# Patient Record
Sex: FEMALE | Race: WHITE | NOT HISPANIC OR LATINO | URBAN - METROPOLITAN AREA
[De-identification: names, ages, dates, MRNs, and addresses within clinical notes are randomized per-mention and may not be internally consistent; named-entity substitution may affect disease eponyms.]

---

## 2023-01-30 ENCOUNTER — INPATIENT (INPATIENT)
Facility: HOSPITAL | Age: 63
LOS: 0 days | Discharge: HOME | DRG: 189 | End: 2023-01-31
Attending: STUDENT IN AN ORGANIZED HEALTH CARE EDUCATION/TRAINING PROGRAM | Admitting: STUDENT IN AN ORGANIZED HEALTH CARE EDUCATION/TRAINING PROGRAM
Payer: SELF-PAY

## 2023-01-30 VITALS
HEIGHT: 68 IN | TEMPERATURE: 98 F | OXYGEN SATURATION: 88 % | RESPIRATION RATE: 18 BRPM | WEIGHT: 104.94 LBS | HEART RATE: 100 BPM | DIASTOLIC BLOOD PRESSURE: 98 MMHG | SYSTOLIC BLOOD PRESSURE: 158 MMHG

## 2023-01-30 LAB
ALBUMIN SERPL ELPH-MCNC: 4 G/DL — SIGNIFICANT CHANGE UP (ref 3.4–5)
ALP SERPL-CCNC: 93 U/L — SIGNIFICANT CHANGE UP (ref 40–120)
ALT FLD-CCNC: 46 U/L — HIGH (ref 12–42)
ANION GAP SERPL CALC-SCNC: 9 MMOL/L — SIGNIFICANT CHANGE UP (ref 9–16)
APPEARANCE UR: CLEAR — SIGNIFICANT CHANGE UP
APTT BLD: 34.1 SEC — SIGNIFICANT CHANGE UP (ref 27.5–35.5)
AST SERPL-CCNC: 38 U/L — HIGH (ref 15–37)
BASOPHILS # BLD AUTO: 0.03 K/UL — SIGNIFICANT CHANGE UP (ref 0–0.2)
BASOPHILS NFR BLD AUTO: 0.4 % — SIGNIFICANT CHANGE UP (ref 0–2)
BILIRUB SERPL-MCNC: 0.3 MG/DL — SIGNIFICANT CHANGE UP (ref 0.2–1.2)
BILIRUB UR-MCNC: NEGATIVE — SIGNIFICANT CHANGE UP
BUN SERPL-MCNC: 10 MG/DL — SIGNIFICANT CHANGE UP (ref 7–23)
CALCIUM SERPL-MCNC: 8.9 MG/DL — SIGNIFICANT CHANGE UP (ref 8.5–10.5)
CHLORIDE SERPL-SCNC: 101 MMOL/L — SIGNIFICANT CHANGE UP (ref 96–108)
CO2 SERPL-SCNC: 28 MMOL/L — SIGNIFICANT CHANGE UP (ref 22–31)
COLOR SPEC: YELLOW — SIGNIFICANT CHANGE UP
CREAT SERPL-MCNC: 0.75 MG/DL — SIGNIFICANT CHANGE UP (ref 0.5–1.3)
DIFF PNL FLD: NEGATIVE — SIGNIFICANT CHANGE UP
EGFR: 90 ML/MIN/1.73M2 — SIGNIFICANT CHANGE UP
EOSINOPHIL # BLD AUTO: 0.03 K/UL — SIGNIFICANT CHANGE UP (ref 0–0.5)
EOSINOPHIL NFR BLD AUTO: 0.4 % — SIGNIFICANT CHANGE UP (ref 0–6)
FLUAV H1 2009 PAND RNA SPEC QL NAA+PROBE: SIGNIFICANT CHANGE UP
FLUAV H1 RNA SPEC QL NAA+PROBE: SIGNIFICANT CHANGE UP
FLUAV H3 RNA SPEC QL NAA+PROBE: SIGNIFICANT CHANGE UP
FLUAV SUBTYP SPEC NAA+PROBE: SIGNIFICANT CHANGE UP
FLUBV RNA SPEC QL NAA+PROBE: SIGNIFICANT CHANGE UP
GLUCOSE SERPL-MCNC: 123 MG/DL — HIGH (ref 70–99)
GLUCOSE UR QL: NEGATIVE — SIGNIFICANT CHANGE UP
HCT VFR BLD CALC: 44.6 % — SIGNIFICANT CHANGE UP (ref 34.5–45)
HGB BLD-MCNC: 14.8 G/DL — SIGNIFICANT CHANGE UP (ref 11.5–15.5)
HMPV RNA SPEC QL NAA+PROBE: DETECTED
IMM GRANULOCYTES NFR BLD AUTO: 0.1 % — SIGNIFICANT CHANGE UP (ref 0–0.9)
INR BLD: 0.98 — SIGNIFICANT CHANGE UP (ref 0.88–1.16)
KETONES UR-MCNC: 15 MG/DL
LACTATE SERPL-SCNC: 0.8 MMOL/L — SIGNIFICANT CHANGE UP (ref 0.4–2)
LEUKOCYTE ESTERASE UR-ACNC: NEGATIVE — SIGNIFICANT CHANGE UP
LYMPHOCYTES # BLD AUTO: 0.95 K/UL — LOW (ref 1–3.3)
LYMPHOCYTES # BLD AUTO: 13.2 % — SIGNIFICANT CHANGE UP (ref 13–44)
MCHC RBC-ENTMCNC: 31.9 PG — SIGNIFICANT CHANGE UP (ref 27–34)
MCHC RBC-ENTMCNC: 33.2 GM/DL — SIGNIFICANT CHANGE UP (ref 32–36)
MCV RBC AUTO: 96.1 FL — SIGNIFICANT CHANGE UP (ref 80–100)
MONOCYTES # BLD AUTO: 0.59 K/UL — SIGNIFICANT CHANGE UP (ref 0–0.9)
MONOCYTES NFR BLD AUTO: 8.2 % — SIGNIFICANT CHANGE UP (ref 2–14)
NEUTROPHILS # BLD AUTO: 5.61 K/UL — SIGNIFICANT CHANGE UP (ref 1.8–7.4)
NEUTROPHILS NFR BLD AUTO: 77.7 % — HIGH (ref 43–77)
NITRITE UR-MCNC: NEGATIVE — SIGNIFICANT CHANGE UP
NRBC # BLD: 0 /100 WBCS — SIGNIFICANT CHANGE UP (ref 0–0)
PH UR: 6 — SIGNIFICANT CHANGE UP (ref 5–8)
PLATELET # BLD AUTO: 181 K/UL — SIGNIFICANT CHANGE UP (ref 150–400)
POTASSIUM SERPL-MCNC: 3.9 MMOL/L — SIGNIFICANT CHANGE UP (ref 3.5–5.3)
POTASSIUM SERPL-SCNC: 3.9 MMOL/L — SIGNIFICANT CHANGE UP (ref 3.5–5.3)
PROT SERPL-MCNC: 7.6 G/DL — SIGNIFICANT CHANGE UP (ref 6.4–8.2)
PROT UR-MCNC: NEGATIVE MG/DL — SIGNIFICANT CHANGE UP
PROTHROM AB SERPL-ACNC: 11.5 SEC — SIGNIFICANT CHANGE UP (ref 10.5–13.4)
RAPID RVP RESULT: DETECTED
RBC # BLD: 4.64 M/UL — SIGNIFICANT CHANGE UP (ref 3.8–5.2)
RBC # FLD: 14.3 % — SIGNIFICANT CHANGE UP (ref 10.3–14.5)
SARS-COV-2 RNA SPEC QL NAA+PROBE: SIGNIFICANT CHANGE UP
SODIUM SERPL-SCNC: 138 MMOL/L — SIGNIFICANT CHANGE UP (ref 132–145)
SP GR SPEC: 1.01 — SIGNIFICANT CHANGE UP (ref 1–1.03)
TROPONIN I, HIGH SENSITIVITY RESULT: 4.7 NG/L — SIGNIFICANT CHANGE UP
UROBILINOGEN FLD QL: 0.2 E.U./DL — SIGNIFICANT CHANGE UP
WBC # BLD: 7.22 K/UL — SIGNIFICANT CHANGE UP (ref 3.8–10.5)
WBC # FLD AUTO: 7.22 K/UL — SIGNIFICANT CHANGE UP (ref 3.8–10.5)

## 2023-01-30 PROCEDURE — 99285 EMERGENCY DEPT VISIT HI MDM: CPT

## 2023-01-30 PROCEDURE — 71045 X-RAY EXAM CHEST 1 VIEW: CPT | Mod: 26

## 2023-01-30 PROCEDURE — 99223 1ST HOSP IP/OBS HIGH 75: CPT | Mod: GC

## 2023-01-30 RX ORDER — IPRATROPIUM/ALBUTEROL SULFATE 18-103MCG
3 AEROSOL WITH ADAPTER (GRAM) INHALATION ONCE
Refills: 0 | Status: COMPLETED | OUTPATIENT
Start: 2023-01-30 | End: 2023-01-30

## 2023-01-30 RX ORDER — ALBUTEROL 90 UG/1
2.5 AEROSOL, METERED ORAL ONCE
Refills: 0 | Status: COMPLETED | OUTPATIENT
Start: 2023-01-30 | End: 2023-01-30

## 2023-01-30 RX ORDER — NICOTINE POLACRILEX 2 MG
14 GUM BUCCAL ONCE
Refills: 0 | Status: DISCONTINUED | OUTPATIENT
Start: 2023-01-30 | End: 2023-01-30

## 2023-01-30 RX ORDER — MAGNESIUM SULFATE 500 MG/ML
2 VIAL (ML) INJECTION ONCE
Refills: 0 | Status: COMPLETED | OUTPATIENT
Start: 2023-01-30 | End: 2023-01-30

## 2023-01-30 RX ORDER — SODIUM CHLORIDE 9 MG/ML
1500 INJECTION INTRAMUSCULAR; INTRAVENOUS; SUBCUTANEOUS ONCE
Refills: 0 | Status: COMPLETED | OUTPATIENT
Start: 2023-01-30 | End: 2023-01-30

## 2023-01-30 RX ORDER — NICOTINE POLACRILEX 2 MG
1 GUM BUCCAL ONCE
Refills: 0 | Status: COMPLETED | OUTPATIENT
Start: 2023-01-30 | End: 2023-01-30

## 2023-01-30 RX ADMIN — Medication 3 MILLILITER(S): at 11:17

## 2023-01-30 RX ADMIN — SODIUM CHLORIDE 1500 MILLILITER(S): 9 INJECTION INTRAMUSCULAR; INTRAVENOUS; SUBCUTANEOUS at 14:11

## 2023-01-30 RX ADMIN — Medication 80 MILLIGRAM(S): at 20:16

## 2023-01-30 RX ADMIN — ALBUTEROL 2.5 MILLIGRAM(S): 90 AEROSOL, METERED ORAL at 14:00

## 2023-01-30 RX ADMIN — Medication 1 PATCH: at 15:49

## 2023-01-30 RX ADMIN — ALBUTEROL 2.5 MILLIGRAM(S): 90 AEROSOL, METERED ORAL at 20:16

## 2023-01-30 RX ADMIN — ALBUTEROL 2.5 MILLIGRAM(S): 90 AEROSOL, METERED ORAL at 16:50

## 2023-01-30 RX ADMIN — Medication 3 MILLILITER(S): at 11:23

## 2023-01-30 RX ADMIN — Medication 80 MILLIGRAM(S): at 11:23

## 2023-01-30 RX ADMIN — Medication 150 GRAM(S): at 16:49

## 2023-01-30 RX ADMIN — SODIUM CHLORIDE 1500 MILLILITER(S): 9 INJECTION INTRAMUSCULAR; INTRAVENOUS; SUBCUTANEOUS at 11:23

## 2023-01-30 NOTE — ED ADULT TRIAGE NOTE - CHIEF COMPLAINT QUOTE
Pt walks in c/o subjective fever for 2 days and shortness of breath since 5pm last night. SpO2 88% on RA during triage.

## 2023-01-30 NOTE — ED PROVIDER NOTE - PHYSICAL EXAMINATION
General:  Moderate distress.  Tachypneic, dyspneic   HEENT:  No conjunctival injection, no ocular discharge, MM dry   Chest:  Non-tender, no crepitance  Lungs: Diminished sounds bilaterally with wheezing   Heart:  s1s2 normal, no murmur  Abdomen:  soft, non-tender, non-distended  :  Deferred  Rectal:  Deferred  Extremities: No edema, normal perfusion, no joint swelling or tenderness.  no calf tenderness  Neuro:  Alert and oriented x 3, cn2-12 intact, full strength, sensory grossly intact, normal gait  Psychiatry:  Calm, cooperative, no expression of suicidal or homicidal ideation

## 2023-01-30 NOTE — ED ADULT NURSE REASSESSMENT NOTE - NS ED NURSE REASSESS COMMENT FT1
Bedboard cancelled bed. Charge aware, ANM and providers made aware.
Dr William currently reviewing pt
Received care back from mery oropeza, checked in on patient, still feeling sob and wheezy when removing 02 to go to bathroom, pt independent, able to remove own 02 nasal canulla and place back on when back from en suite bathroom, meds given, pt talking in full sentences at present, gcs 15, awaiting transport , pt aware
pt got up to go to toilet (en suite in room), immediately felt tight in the chest, came back to bed with 02 sats 86% on room air and pt had distinct increase work of breathing, placed back on 5 litres of 02 via nasal prongs, dr fields aware, given albuterol neb, o2 sats currently 98% with neb going, dr fields aware, sitting bolt upright in bed as before
Received Pt from previous RN sitting up on stretcher asleep, breathing with ease on NC and NAD. CCM in place. Pt awaiting bed status.

## 2023-01-30 NOTE — ED PROVIDER NOTE - CARE PLAN
Principal Discharge DX:	Infection due to human metapneumovirus (hMPV)  Secondary Diagnosis:	Acute hypoxemic respiratory failure   1

## 2023-01-30 NOTE — ED PROVIDER NOTE - OBJECTIVE STATEMENT
64 yo F with cough and progressively worsening SOB since yesterday  Patient is a daily smoker 1/2 PPD  No prior dx of COPD/asthma, no history of use of inhalers  No fever, non-productive cough  No chest pain, no leg swelling  No similar prior episodes

## 2023-01-30 NOTE — ED ADULT NURSE NOTE - HISTORY OF COVID-19 VACCINATION
47 y/o female with hx HTN, Psych disorder presents to the ED c/o "I bit into a pill 2 days ago and I broke my front tooth. Can I get it fixed?" no fever/ chills/ difficulty swallowing Yes

## 2023-01-30 NOTE — ED PROVIDER NOTE - CLINICAL SUMMARY MEDICAL DECISION MAKING FREE TEXT BOX
62 yo F who is a smoker with URI symptoms associated with SOB.  Moderate distress with hypoxia.  Differential includes COPD, Viral Illness, pneumonia or a combination of 62 yo F who is a smoker with URI symptoms associated with SOB.  Moderate distress with hypoxia.  Differential includes COPD, Viral Illness, pneumonia or a combination of these issues.  Will need labs, xr, nebs, steroids and will re-evaluate.    Summary of care: Patient improved from time of arrival but remains tachypneic and dyspneic.  Desaturates into the low 80s with minimal exertion off oxygen.  Accordingly, patient informed that she will need admission.  We discussed results and diagnosis as well as care plan. Patient agreed with plan to go to [facility].    Case d/w Dr. Alvarado    The patient was seen immediately upon arrival due to a high probability of imminent or life-threatening deterioration secondary to respiratory distress which required my direct attention, intervention, and personal management at the bedside. I have personally provided critical care time exclusive of time spent on separately billable procedures. Time includes review of laboratory data, radiology results, discussion with consultants, discussion with the patient's family, and monitoring for potential decompensation.  Critical care time: 30 minutes.

## 2023-01-30 NOTE — ED PROVIDER NOTE - NS ED ROS FT
Constitutional:  No fever, no weakness, no dizziness  HEENT:  No change in vision, no discharge, + congestion  Cardiac:  No chest pain, palpitations  Pulm:  +cough +sob  GI:  No abd pain, no vomiting, no diarrhea  MSK:  No joint pain or swelling/no calf pain

## 2023-01-31 ENCOUNTER — TRANSCRIPTION ENCOUNTER (OUTPATIENT)
Age: 63
End: 2023-01-31

## 2023-01-31 VITALS — WEIGHT: 104.94 LBS

## 2023-01-31 DIAGNOSIS — Z29.9 ENCOUNTER FOR PROPHYLACTIC MEASURES, UNSPECIFIED: ICD-10-CM

## 2023-01-31 DIAGNOSIS — J96.01 ACUTE RESPIRATORY FAILURE WITH HYPOXIA: ICD-10-CM

## 2023-01-31 DIAGNOSIS — J44.1 CHRONIC OBSTRUCTIVE PULMONARY DISEASE WITH (ACUTE) EXACERBATION: ICD-10-CM

## 2023-01-31 DIAGNOSIS — R79.89 OTHER SPECIFIED ABNORMAL FINDINGS OF BLOOD CHEMISTRY: ICD-10-CM

## 2023-01-31 DIAGNOSIS — F17.200 NICOTINE DEPENDENCE, UNSPECIFIED, UNCOMPLICATED: ICD-10-CM

## 2023-01-31 DIAGNOSIS — B97.81 HUMAN METAPNEUMOVIRUS AS THE CAUSE OF DISEASES CLASSIFIED ELSEWHERE: ICD-10-CM

## 2023-01-31 PROBLEM — Z00.00 ENCOUNTER FOR PREVENTIVE HEALTH EXAMINATION: Status: ACTIVE | Noted: 2023-01-31

## 2023-01-31 PROBLEM — Z78.9 OTHER SPECIFIED HEALTH STATUS: Chronic | Status: ACTIVE | Noted: 2023-01-30

## 2023-01-31 LAB
A1C WITH ESTIMATED AVERAGE GLUCOSE RESULT: 5.5 % — SIGNIFICANT CHANGE UP (ref 4–5.6)
ANION GAP SERPL CALC-SCNC: 8 MMOL/L — SIGNIFICANT CHANGE UP (ref 5–17)
BASOPHILS # BLD AUTO: 0.01 K/UL — SIGNIFICANT CHANGE UP (ref 0–0.2)
BASOPHILS NFR BLD AUTO: 0.1 % — SIGNIFICANT CHANGE UP (ref 0–2)
BLD GP AB SCN SERPL QL: NEGATIVE — SIGNIFICANT CHANGE UP
BUN SERPL-MCNC: 9 MG/DL — SIGNIFICANT CHANGE UP (ref 7–23)
CALCIUM SERPL-MCNC: 8.9 MG/DL — SIGNIFICANT CHANGE UP (ref 8.4–10.5)
CHLORIDE SERPL-SCNC: 101 MMOL/L — SIGNIFICANT CHANGE UP (ref 96–108)
CO2 SERPL-SCNC: 29 MMOL/L — SIGNIFICANT CHANGE UP (ref 22–31)
CREAT SERPL-MCNC: 0.56 MG/DL — SIGNIFICANT CHANGE UP (ref 0.5–1.3)
EGFR: 103 ML/MIN/1.73M2 — SIGNIFICANT CHANGE UP
EOSINOPHIL # BLD AUTO: 0 K/UL — SIGNIFICANT CHANGE UP (ref 0–0.5)
EOSINOPHIL NFR BLD AUTO: 0 % — SIGNIFICANT CHANGE UP (ref 0–6)
ESTIMATED AVERAGE GLUCOSE: 111 MG/DL — SIGNIFICANT CHANGE UP (ref 68–114)
GLUCOSE BLDC GLUCOMTR-MCNC: 115 MG/DL — HIGH (ref 70–99)
GLUCOSE BLDC GLUCOMTR-MCNC: 172 MG/DL — HIGH (ref 70–99)
GLUCOSE SERPL-MCNC: 127 MG/DL — HIGH (ref 70–99)
HCT VFR BLD CALC: 41.1 % — SIGNIFICANT CHANGE UP (ref 34.5–45)
HCV AB S/CO SERPL IA: 0.05 S/CO — SIGNIFICANT CHANGE UP
HCV AB SERPL-IMP: SIGNIFICANT CHANGE UP
HGB BLD-MCNC: 13.6 G/DL — SIGNIFICANT CHANGE UP (ref 11.5–15.5)
IMM GRANULOCYTES NFR BLD AUTO: 0.5 % — SIGNIFICANT CHANGE UP (ref 0–0.9)
LYMPHOCYTES # BLD AUTO: 0.8 K/UL — LOW (ref 1–3.3)
LYMPHOCYTES # BLD AUTO: 9.3 % — LOW (ref 13–44)
MAGNESIUM SERPL-MCNC: 2.3 MG/DL — SIGNIFICANT CHANGE UP (ref 1.6–2.6)
MCHC RBC-ENTMCNC: 31.9 PG — SIGNIFICANT CHANGE UP (ref 27–34)
MCHC RBC-ENTMCNC: 33.1 GM/DL — SIGNIFICANT CHANGE UP (ref 32–36)
MCV RBC AUTO: 96.3 FL — SIGNIFICANT CHANGE UP (ref 80–100)
MONOCYTES # BLD AUTO: 0.73 K/UL — SIGNIFICANT CHANGE UP (ref 0–0.9)
MONOCYTES NFR BLD AUTO: 8.5 % — SIGNIFICANT CHANGE UP (ref 2–14)
NEUTROPHILS # BLD AUTO: 7.01 K/UL — SIGNIFICANT CHANGE UP (ref 1.8–7.4)
NEUTROPHILS NFR BLD AUTO: 81.6 % — HIGH (ref 43–77)
NRBC # BLD: 0 /100 WBCS — SIGNIFICANT CHANGE UP (ref 0–0)
PHOSPHATE SERPL-MCNC: 3.2 MG/DL — SIGNIFICANT CHANGE UP (ref 2.5–4.5)
PLATELET # BLD AUTO: 179 K/UL — SIGNIFICANT CHANGE UP (ref 150–400)
POTASSIUM SERPL-MCNC: 4.1 MMOL/L — SIGNIFICANT CHANGE UP (ref 3.5–5.3)
POTASSIUM SERPL-SCNC: 4.1 MMOL/L — SIGNIFICANT CHANGE UP (ref 3.5–5.3)
RBC # BLD: 4.27 M/UL — SIGNIFICANT CHANGE UP (ref 3.8–5.2)
RBC # FLD: 14.5 % — SIGNIFICANT CHANGE UP (ref 10.3–14.5)
RH IG SCN BLD-IMP: POSITIVE — SIGNIFICANT CHANGE UP
SODIUM SERPL-SCNC: 138 MMOL/L — SIGNIFICANT CHANGE UP (ref 135–145)
TSH SERPL-MCNC: 0.33 UIU/ML — SIGNIFICANT CHANGE UP (ref 0.27–4.2)
WBC # BLD: 8.59 K/UL — SIGNIFICANT CHANGE UP (ref 3.8–10.5)
WBC # FLD AUTO: 8.59 K/UL — SIGNIFICANT CHANGE UP (ref 3.8–10.5)

## 2023-01-31 PROCEDURE — 99285 EMERGENCY DEPT VISIT HI MDM: CPT | Mod: 25

## 2023-01-31 PROCEDURE — 85730 THROMBOPLASTIN TIME PARTIAL: CPT

## 2023-01-31 PROCEDURE — 86803 HEPATITIS C AB TEST: CPT

## 2023-01-31 PROCEDURE — 83036 HEMOGLOBIN GLYCOSYLATED A1C: CPT

## 2023-01-31 PROCEDURE — 36415 COLL VENOUS BLD VENIPUNCTURE: CPT

## 2023-01-31 PROCEDURE — 86850 RBC ANTIBODY SCREEN: CPT

## 2023-01-31 PROCEDURE — 80048 BASIC METABOLIC PNL TOTAL CA: CPT

## 2023-01-31 PROCEDURE — 84100 ASSAY OF PHOSPHORUS: CPT

## 2023-01-31 PROCEDURE — 85610 PROTHROMBIN TIME: CPT

## 2023-01-31 PROCEDURE — 71045 X-RAY EXAM CHEST 1 VIEW: CPT

## 2023-01-31 PROCEDURE — 82962 GLUCOSE BLOOD TEST: CPT

## 2023-01-31 PROCEDURE — 80053 COMPREHEN METABOLIC PANEL: CPT

## 2023-01-31 PROCEDURE — 83735 ASSAY OF MAGNESIUM: CPT

## 2023-01-31 PROCEDURE — 96374 THER/PROPH/DIAG INJ IV PUSH: CPT

## 2023-01-31 PROCEDURE — 86900 BLOOD TYPING SEROLOGIC ABO: CPT

## 2023-01-31 PROCEDURE — 94640 AIRWAY INHALATION TREATMENT: CPT

## 2023-01-31 PROCEDURE — 99239 HOSP IP/OBS DSCHRG MGMT >30: CPT | Mod: GC

## 2023-01-31 PROCEDURE — 96361 HYDRATE IV INFUSION ADD-ON: CPT

## 2023-01-31 PROCEDURE — 84484 ASSAY OF TROPONIN QUANT: CPT

## 2023-01-31 PROCEDURE — 0225U NFCT DS DNA&RNA 21 SARSCOV2: CPT

## 2023-01-31 PROCEDURE — 85025 COMPLETE CBC W/AUTO DIFF WBC: CPT

## 2023-01-31 PROCEDURE — 84443 ASSAY THYROID STIM HORMONE: CPT

## 2023-01-31 PROCEDURE — 81003 URINALYSIS AUTO W/O SCOPE: CPT

## 2023-01-31 PROCEDURE — 87040 BLOOD CULTURE FOR BACTERIA: CPT

## 2023-01-31 PROCEDURE — 83605 ASSAY OF LACTIC ACID: CPT

## 2023-01-31 PROCEDURE — 86901 BLOOD TYPING SEROLOGIC RH(D): CPT

## 2023-01-31 RX ORDER — AZITHROMYCIN 500 MG/1
500 TABLET, FILM COATED ORAL EVERY 24 HOURS
Refills: 0 | Status: DISCONTINUED | OUTPATIENT
Start: 2023-01-31 | End: 2023-01-31

## 2023-01-31 RX ORDER — PANTOPRAZOLE SODIUM 20 MG/1
40 TABLET, DELAYED RELEASE ORAL
Refills: 0 | Status: DISCONTINUED | OUTPATIENT
Start: 2023-01-31 | End: 2023-01-31

## 2023-01-31 RX ORDER — INSULIN LISPRO 100/ML
VIAL (ML) SUBCUTANEOUS AT BEDTIME
Refills: 0 | Status: DISCONTINUED | OUTPATIENT
Start: 2023-01-31 | End: 2023-01-31

## 2023-01-31 RX ORDER — INFLUENZA VIRUS VACCINE 15; 15; 15; 15 UG/.5ML; UG/.5ML; UG/.5ML; UG/.5ML
0.5 SUSPENSION INTRAMUSCULAR ONCE
Refills: 0 | Status: DISCONTINUED | OUTPATIENT
Start: 2023-01-31 | End: 2023-01-31

## 2023-01-31 RX ORDER — ENOXAPARIN SODIUM 100 MG/ML
30 INJECTION SUBCUTANEOUS EVERY 24 HOURS
Refills: 0 | Status: DISCONTINUED | OUTPATIENT
Start: 2023-01-31 | End: 2023-01-31

## 2023-01-31 RX ORDER — IBUPROFEN 200 MG
400 TABLET ORAL EVERY 6 HOURS
Refills: 0 | Status: DISCONTINUED | OUTPATIENT
Start: 2023-01-31 | End: 2023-01-31

## 2023-01-31 RX ORDER — LANOLIN ALCOHOL/MO/W.PET/CERES
3 CREAM (GRAM) TOPICAL AT BEDTIME
Refills: 0 | Status: DISCONTINUED | OUTPATIENT
Start: 2023-01-31 | End: 2023-01-31

## 2023-01-31 RX ORDER — ALBUTEROL 90 UG/1
1 AEROSOL, METERED ORAL
Qty: 28 | Refills: 0
Start: 2023-01-31 | End: 2023-02-06

## 2023-01-31 RX ORDER — IPRATROPIUM/ALBUTEROL SULFATE 18-103MCG
3 AEROSOL WITH ADAPTER (GRAM) INHALATION EVERY 4 HOURS
Refills: 0 | Status: DISCONTINUED | OUTPATIENT
Start: 2023-01-31 | End: 2023-01-31

## 2023-01-31 RX ORDER — ACETAMINOPHEN 500 MG
650 TABLET ORAL EVERY 6 HOURS
Refills: 0 | Status: DISCONTINUED | OUTPATIENT
Start: 2023-01-31 | End: 2023-01-31

## 2023-01-31 RX ADMIN — Medication 3 MILLILITER(S): at 06:22

## 2023-01-31 RX ADMIN — Medication 40 MILLIGRAM(S): at 06:22

## 2023-01-31 RX ADMIN — Medication 1 PATCH: at 07:00

## 2023-01-31 RX ADMIN — Medication 1 PATCH: at 15:00

## 2023-01-31 RX ADMIN — PANTOPRAZOLE SODIUM 40 MILLIGRAM(S): 20 TABLET, DELAYED RELEASE ORAL at 06:22

## 2023-01-31 RX ADMIN — AZITHROMYCIN 500 MILLIGRAM(S): 500 TABLET, FILM COATED ORAL at 06:23

## 2023-01-31 RX ADMIN — Medication 3 MILLILITER(S): at 02:45

## 2023-01-31 NOTE — H&P ADULT - ASSESSMENT
63F no known PMH, current smoker presents for SOB admitted for acute hypoxic respiratory failure 2/2 human metapneumovirus infection.

## 2023-01-31 NOTE — DIETITIAN INITIAL EVALUATION ADULT - ADD RECOMMEND
1. Continue Regular diet  2. Ensure Plus High Protein, once daily (150kcal, 30gm pro)  3. Monitor diet tolerance  4. Monitor Wt, Skin, Pain, GI   5. Honor food preferences as able   *RD to remain available for additional nutrition interventions as needed

## 2023-01-31 NOTE — H&P ADULT - PROBLEM SELECTOR PLAN 2
- supportive care with antipyretics, antitussives, hydration Likely with underlying obstructive lung disease given hx of smoking but she has not seen doctors in many years and never seen a pulmonologist. Received 160mg solumedrol and duonebs in ED. CXR without infiltrates but with hyperinflated lungs. Noted to have diffuse wheezing by ED provider.  - c/w DuoNebs q4  - prednisone 40mg daily for total 5 day course  - azithromycin 500mg for 3 days  - pulm consult in AM  - needs PCP

## 2023-01-31 NOTE — PROGRESS NOTE ADULT - ASSESSMENT
63 year old F with no significant PMH and extensive current smoking history (1/2ppd for 30 years) admitted for acute hypoxic respiratory failure likely in setting of reactive airway disease or acute COPD exacerbation precipitated by metapneumovirus infection.

## 2023-01-31 NOTE — DIETITIAN INITIAL EVALUATION ADULT - OTHER CALCULATIONS
5’8”, CBW 47.6kg/104lb (01/30/23), IBW 140lb, %IBW74.3. CBW used to calculate estimated nutrition needs based on standards of care given pt falls below % of IBW. Adjusted for malnutrition.

## 2023-01-31 NOTE — DISCHARGE NOTE PROVIDER - PROVIDER TOKENS
PROVIDER:[TOKEN:[[id number]],FOLLOWUP:[1 week]] PROVIDER:[TOKEN:[[id number]],SCHEDULEDAPPT:[02/07/2023],SCHEDULEDAPPTTIME:[12:00 PM]]

## 2023-01-31 NOTE — DISCHARGE NOTE NURSING/CASE MANAGEMENT/SOCIAL WORK - NSDCPEFALRISK_GEN_ALL_CORE
For information on Fall & Injury Prevention, visit: [email or web address] OR  [email or web address] OR  https://www.cdc.gov/steadi/patient.html

## 2023-01-31 NOTE — H&P ADULT - PROBLEM SELECTOR PLAN 3
F: PO  E: replete PRN  N: regular  DVT ppx: lovenox  GI PPx: None    FULL CODE    Dispo: [facility] - supportive care with antipyretics, antitussives, hydration

## 2023-01-31 NOTE — PROGRESS NOTE ADULT - SUBJECTIVE AND OBJECTIVE BOX
62 yo female with no significant PMH and current smoker (1/2ppd for 30 years) who presents to the ED for chest tightening for 2 days. Pt reports subjective fevers 4 days ago that resolved with Advil. She reports that once her fever broke, she started experiencing chest tightening which persisted for 2 days. Her breathing was exacerbated on even slight exertion. She reports an associated cough. She denied any chest pain, nausea, vomiting, changes in her bowel or urinary movements. She has never experienced symptoms like this before and reports having bronchitis a few years ago as well as  surgery for a uterine cyst in her 20s.     Today, patient is seen at bedside, appears well and has no difficulty breathing. She is on nasal cannula (4L/min) but states she was able to walk to and from the bathroom overnight without oxygen. She reports a cough. She denies any chest tightening, shortness of breath, chest pain, fevers, chills, or nausea. Overall, she states she feels better.      T(C): 36.6 (01-31-23 @ 05:20), Max: 37.1 (01-30-23 @ 15:47)  HR: 79 (01-31-23 @ 05:20) (79 - 100)  BP: 112/69 (01-31-23 @ 05:20) (112/69 - 160/90)  RR: 17 (01-31-23 @ 05:20) (17 - 22)  SpO2: 97% (01-31-23 @ 05:20) (88% - 100%)    CONSTITUTIONAL: AOx3 Well groomed, no apparent distress, thin appearing   EYES: PERRLA and symmetric, EOMI, No conjunctival or scleral injection, non-icteric   RESP: On nasal canula (4L/min), mo respiratory distress, no use of accessory muscles; clear to ascultation with slight expiratory wheezing noted on left upper lobe  CV: RRR, +S1S2,      62 yo female with no significant PMH and current smoker (1/2ppd for 30 years) who presents to the ED for chest tightening for 2 days. Pt reports subjective fevers 4 days ago that resolved with Advil. She reports that once her fever broke, she started experiencing chest tightening which persisted for 2 days. Her breathing was exacerbated on even slight exertion. She reports an associated cough. She denied any chest pain, nausea, vomiting, changes in her bowel or urinary movements. She has never experienced symptoms like this before and reports having bronchitis a few years ago as well as  surgery for a uterine cyst in her 20s.     Today, patient is seen at bedside, appears well and has no difficulty breathing. She is on nasal cannula (4L/min) but states she was able to walk to and from the bathroom overnight without oxygen. She reports a cough. She denies any chest tightening, shortness of breath, chest pain, fevers, chills, or nausea. Overall, she states she feels better.      PMH: none    Medications: none    Past Surgical History: uterine cysts    Allergies: penicillin (broke out in hives when she was younger)    SH: drink socially, one with dinner; smokes 1/2 ppd for 30 years; lives alone and works     T(C): 36.6 (01-31-23 @ 05:20), Max: 37.1 (01-30-23 @ 15:47)  HR: 79 (01-31-23 @ 05:20) (79 - 100)  BP: 112/69 (01-31-23 @ 05:20) (112/69 - 160/90)  RR: 17 (01-31-23 @ 05:20) (17 - 22)  SpO2: 97% (01-31-23 @ 05:20) (88% - 100%)    CONSTITUTIONAL: AOx3 Well groomed, no apparent distress, thin appearing   EYES: PERRLA and symmetric, EOMI, No conjunctival or scleral injection, non-icteric   RESP: On nasal canula (4L/min), mo respiratory distress, no use of accessory muscles; clear to ascultation with slight expiratory wheezing noted on left upper lobe  CV: RRR, +S1S2,      62 yo female with no significant PMH and current smoker (1/2ppd for 30 years) who presents to the ED for chest tightening for 2 days. Pt reports subjective fevers 4 days ago that resolved with Advil. She reports that once her fever broke, she started experiencing chest tightening which persisted for 2 days. Her breathing was exacerbated on even slight exertion. She reports an associated cough. She denied any chest pain, nausea, vomiting, changes in her bowel or urinary movements. She has never experienced symptoms like this before and reports having bronchitis a few years ago as well as  surgery for a uterine cyst in her 20s.     Today, patient is seen at bedside, appears well and has no difficulty breathing. She is on nasal cannula (4L/min) but states she was able to walk to and from the bathroom overnight without oxygen. She reports a cough. She denies any chest tightening, shortness of breath, chest pain, fevers, chills, or nausea. Overall, she states she feels better.      PMH: none    Medications: none    Past Surgical History: uterine cysts    Allergies: penicillin (broke out in hives when she was younger)    SH: drink socially, one with dinner; smokes 1/2 ppd for 30 years; lives alone and works     T(C): 36.6 (01-31-23 @ 05:20), Max: 37.1 (01-30-23 @ 15:47)  HR: 79 (01-31-23 @ 05:20) (79 - 100)  BP: 112/69 (01-31-23 @ 05:20) (112/69 - 160/90)  RR: 17 (01-31-23 @ 05:20) (17 - 22)  SpO2: 97% (01-31-23 @ 05:20) (88% - 100%)      LABS:                        13.6   8.59  )-----------( 179      ( 31 Jan 2023 05:30 )             41.1     01-31    138  |  101  |  9   ----------------------------<  127<H>  4.1   |  29  |  0.56    Ca    8.9      31 Jan 2023 05:30  Phos  3.2     01-31  Mg     2.3     01-31    TPro  7.6  /  Alb  4.0  /  TBili  0.3  /  DBili  x   /  AST  38<H>  /  ALT  46<H>  /  AlkPhos  93  01-30    PT/INR - ( 30 Jan 2023 11:13 )   PT: 11.5 sec;   INR: 0.98          PTT - ( 30 Jan 2023 11:13 )  PTT:34.1 sec    PHYSICAL EXAM:  CONSTITUTIONAL: AOx3 Well groomed, no apparent distress, thin appearing   EYES: PERRLA and symmetric, EOMI, No conjunctival or scleral injection, non-icteric   RESP: On nasal canula (4L/min), mo respiratory distress, no use of accessory muscles; clear to ascultation with slight expiratory wheezing noted on left upper lobe  CV: RRR, +S1S2  Gastrointestinal: abdomen soft, NT/ND; no rebound or guarding; +BSx4  Extremities: WWP, no clubbing or cyanosis; no peripheral edema  Musculoskeletal: NROM x4  Vascular: 2+ radial, DP/PT pulses B/L  Dermatologic: skin warm, dry and intact; no rashes, wounds, or scars  Lymphatic: no submandibular or cervical LAD  Neurologic: AAOx3; no focal deficits  Psychiatric: affect and characteristics of appearance, verbalizations, behaviors are appropriate

## 2023-01-31 NOTE — DIETITIAN INITIAL EVALUATION ADULT - PERTINENT MEDS FT
MEDICATIONS  (STANDING):  albuterol/ipratropium for Nebulization 3 milliLiter(s) Nebulizer every 4 hours  azithromycin   Tablet 500 milliGRAM(s) Oral every 24 hours  enoxaparin Injectable 30 milliGRAM(s) SubCutaneous every 24 hours  influenza   Vaccine 0.5 milliLiter(s) IntraMuscular once  insulin lispro (ADMELOG) corrective regimen sliding scale   SubCutaneous at bedtime  pantoprazole    Tablet 40 milliGRAM(s) Oral before breakfast  predniSONE   Tablet 40 milliGRAM(s) Oral every 24 hours    MEDICATIONS  (PRN):  acetaminophen     Tablet .. 650 milliGRAM(s) Oral every 6 hours PRN Temp greater or equal to 38.5C (101.3F), Moderate Pain (4 - 6)  benzonatate 100 milliGRAM(s) Oral three times a day PRN Cough-1st line  guaiFENesin Oral Liquid (Sugar-Free) 200 milliGRAM(s) Oral every 6 hours PRN Cough  ibuprofen  Tablet. 400 milliGRAM(s) Oral every 6 hours PRN Temp greater or equal to 38C (100.4F), Mild Pain (1 - 3)  melatonin 3 milliGRAM(s) Oral at bedtime PRN Insomnia

## 2023-01-31 NOTE — H&P ADULT - PROBLEM SELECTOR PLAN 5
F: PO  E: replete PRN  N: regular  DVT ppx: lovenox  GI PPx: None    FULL CODE    Dispo: [facility]

## 2023-01-31 NOTE — DISCHARGE NOTE NURSING/CASE MANAGEMENT/SOCIAL WORK - PATIENT PORTAL LINK FT
You can access the FollowMyHealth Patient Portal offered by [facility] by registering at the following website: [email or web address]. By joining [employer]’s FollowMyHealth portal, you will also be able to view your health information using other applications (apps) compatible with our system.

## 2023-01-31 NOTE — PROGRESS NOTE ADULT - PROBLEM SELECTOR PLAN 4
- slightly elevated ALT and AST on ED admission  - PCP follow-up for liver ultrasound, hepatitis panel, and repeat LFTs

## 2023-01-31 NOTE — DISCHARGE NOTE NURSING/CASE MANAGEMENT/SOCIAL WORK - NSDCFUADDAPPT_GEN_ALL_CORE_FT
Please bring your Insurance card, Photo ID and Discharge paperwork to the following appointment:    (1) Please follow up with Dr. Luis Colon on behalf of Dr. Emory Coates at [address] [place] [address], [address] [place] [address] on 02/07/2023 at 12:00pm.    Appointment was scheduled by Ms. PIPER Mancia, Referral Coordinator.

## 2023-01-31 NOTE — H&P ADULT - HISTORY OF PRESENT ILLNESS
Yes
63F no known PMH, current smoker presents for SOB. She started having fevers 2 days ago and then progressively felt more SOB since yesterday. She also has a dry cough. Denies known sick contacts, n/v, diarrhea, chest pain, abdominal pain. She smokes 1/2ppd and has been doing so for about 20 years. Does not take any medications and cannot remember the last time she saw a physician. Never seen by a pulmonologist.     In the ED, noted to be tachycardic to 100 and O2 88%. Placed on NC. Given 160mg IV solumedrol, 1.5L NS, nebulizers, and Mg.

## 2023-01-31 NOTE — DISCHARGE NOTE NURSING/CASE MANAGEMENT/SOCIAL WORK - NSDCPEWEB_GEN_ALL_CORE
[facility] for Tobacco Control website --- [email or web address] website --- [email or web address]

## 2023-01-31 NOTE — H&P ADULT - PROBLEM SELECTOR PLAN 4
smokes 1/2 ppd for 20 years. Still thinking about quitting, she is unsure.  - smoking cessation counseling

## 2023-01-31 NOTE — PROGRESS NOTE ADULT - PROBLEM SELECTOR PLAN 3
- current smoker, 1/2 ppd for 30 years  - currently on nasal cannula, duonebs, prednisone 40mg for 5 days, and azithromycin 500mg for 3 days   - consult pulm for COPD work-up and management  - outpatient PCP follow-up - current smoker, 1/2 ppd for 30 years  - currently on nasal cannula, duonebs, prednisone 40mg for 5 days, and azithromycin 500mg for 3 days   - outpatient Pulm follow-up for COPD work-up and management  - outpatient PCP follow-up

## 2023-01-31 NOTE — DISCHARGE NOTE PROVIDER - NSDCFUSCHEDAPPT_GEN_ALL_CORE_FT
[facility]  INTMED [address]  Scheduled Appointment: 02/07/2023

## 2023-01-31 NOTE — PATIENT PROFILE ADULT - FALL HARM RISK - HARM RISK INTERVENTIONS
Communicate Risk of Fall with Harm to all staff/Monitor gait and stability/Reinforce activity limits and safety measures with patient and family/Tailored Fall Risk Interventions/Use of alarms - bed, chair and/or voice tab/Visual Cue: Yellow wristband and red socks/Bed in lowest position, wheels locked, appropriate side rails in place/Call bell, personal items and telephone in reach/Instruct patient to call for assistance before getting out of bed or chair/Non-slip footwear when patient is out of bed/Claude to call system/Physically safe environment - no spills, clutter or unnecessary equipment/Purposeful Proactive Rounding/Room/bathroom lighting operational, light cord in reach

## 2023-01-31 NOTE — DISCHARGE NOTE PROVIDER - DETAILS OF MALNUTRITION DIAGNOSIS/DIAGNOSES
This patient has been assessed with a concern for Malnutrition and was treated during this hospitalization for the following Nutrition diagnosis/diagnoses:     -  01/31/2023: Moderate protein-calorie malnutrition   -  01/31/2023: Underweight (BMI < 19)

## 2023-01-31 NOTE — PROGRESS NOTE ADULT - PROBLEM SELECTOR PLAN 6
F: PO  E: replete PRN  N: regular  DVT ppx: lovenox  GI PPx: None  FULL CODE  Dispo: [facility]

## 2023-01-31 NOTE — DISCHARGE NOTE PROVIDER - NSDCMRMEDTOKEN_GEN_ALL_CORE_FT
albuterol 90 mcg/inh inhalation powder: 1 puff(s) inhaled every 6 hours as needed for shortness of breath  predniSONE 20 mg oral tablet: 2 tab(s) orally every 24 hours days    MEDS TO BED

## 2023-01-31 NOTE — DISCHARGE NOTE PROVIDER - CARE PROVIDER_API CALL
Emory Coates)  Internal Medicine  [address] [place] [address]  Phone: [phone]  Fax: [phone]  Follow Up Time: 1 week   Emory Coates)  Internal Medicine  [address] [place] [address]  Phone: [phone]  Fax: [phone]  Scheduled Appointment: 02/07/2023 12:00 PM

## 2023-01-31 NOTE — DIETITIAN INITIAL EVALUATION ADULT - PERTINENT LABORATORY DATA
01-31    138  |  101  |  9   ----------------------------<  127<H>  4.1   |  29  |  0.56    Ca    8.9      31 Jan 2023 05:30  Phos  3.2     01-31  Mg     2.3     01-31    TPro  7.6  /  Alb  4.0  /  TBili  0.3  /  DBili  x   /  AST  38<H>  /  ALT  46<H>  /  AlkPhos  93  01-30  POCT Blood Glucose.: 172 mg/dL (01-31-23 @ 12:47)  A1C with Estimated Average Glucose Result: 5.5 % (01-31-23 @ 05:30)

## 2023-01-31 NOTE — H&P ADULT - NSHPLABSRESULTS_GEN_ALL_CORE
14.8   7.22  )-----------( 181      ( 2023 11:13 )             44.6           138  |  101  |  10  ----------------------------<  123<H>  3.9   |  28  |  0.75    Ca    8.9      2023 11:13    TPro  7.6  /  Alb  4.0  /  TBili  0.3  /  DBili  x   /  AST  38<H>  /  ALT  46<H>  /  AlkPhos  93                Urinalysis Basic - ( 2023 11:13 )    Color: Yellow / Appearance: Clear / S.010 / pH: x  Gluc: x / Ketone: 15 mg/dL  / Bili: NEGATIVE / Urobili: 0.2 E.U./dL   Blood: x / Protein: NEGATIVE mg/dL / Nitrite: NEGATIVE   Leuk Esterase: NEGATIVE / RBC: x / WBC x   Sq Epi: x / Non Sq Epi: x / Bacteria: x        PT/INR - ( 2023 11:13 )   PT: 11.5 sec;   INR: 0.98          PTT - ( 2023 11:13 )  PTT:34.1 sec    Lactate Trend   @ 11:13 Lactate:0.8             CAPILLARY BLOOD GLUCOSE

## 2023-01-31 NOTE — H&P ADULT - NSHPSOCIALHISTORY_GEN_ALL_CORE
Living situation: by self   Tobacco: 1/2ppd, at least 10 pack years  Alcohol: 1-2 glasses of wine occasionally  Illicit drug use: denies

## 2023-01-31 NOTE — DISCHARGE NOTE PROVIDER - ATTENDING DISCHARGE PHYSICAL EXAMINATION:
CONSTITUTIONAL: AOx3 Well groomed, no apparent distress, thin appearing   EYES: PERRLA and symmetric, EOMI, No conjunctival or scleral injection, non-icteric   RESP: On nasal canula (4L/min), mo respiratory distress, no use of accessory muscles; clear to ascultation with slight expiratory wheezing noted on left upper lobe  CV: RRR, +S1S2  Gastrointestinal: abdomen soft, NT/ND; no rebound or guarding; +BSx4  Extremities: WWP, no clubbing or cyanosis; no peripheral edema  Musculoskeletal: NROM x4  Vascular: 2+ radial, DP/PT pulses B/L  Dermatologic: skin warm, dry and intact; no rashes, wounds, or scars  Lymphatic: no submandibular or cervical LAD  Neurologic: AAOx3; no focal deficits  Psychiatric: affect and characteristics of appearance, verbalizations, behaviors are appropriate    Problem List  #Acute Hypoxic Respiratory Failure  #hMPV infection   #Reactive Airway disease    Sent home with 5d course of prednisone and albuterol inhaler, plan to establish care with PMD And outpatient PFTs.     Of note, there is no confirmation patient had COPD, therefore acute exacerbation of COPD was ruled out on admission.

## 2023-01-31 NOTE — DIETITIAN INITIAL EVALUATION ADULT - NUTRITIONGOAL OUTCOME1
Consistently meet >75% est needs during admission   Show no further s/s malnutrition throughout admission

## 2023-01-31 NOTE — DISCHARGE NOTE PROVIDER - NSDCFUADDAPPT_GEN_ALL_CORE_FT
Please bring your Insurance card, Photo ID and Discharge paperwork to the following appointment:    (1) Please follow up with Dr. Luis Colon on behalf of Dr. Emory Coates at [address] [place] [address], [address] [place] [address] on 02/07/2023 at 12:00pm.    Appointment was scheduled by Ms. PIPER Mancia, Referral Coordinator.

## 2023-01-31 NOTE — DISCHARGE NOTE PROVIDER - HOSPITAL COURSE
#Discharge: do not delete    Patient is __ yo M/F with past medical history of _____ presented with _____, found to have _____ (one liner)    Hospital course (by problem):     Patient was discharged to: (home/[place]/acute rehab/hospice, etc, and with what services – home health PT/RN? Home O2?)    New medications:   Changes to old medications:  Medications that were stopped:    Items to follow up as outpatient:    Physical exam at the time of discharge:       #Discharge: do not delete    63 year old F with no significant PMH and extensive current smoking history (1/2ppd for 30 years) admitted for acute hypoxic respiratory failure likely in setting of reactive airway disease or acute COPD exacerbation (previously undiagnosed) precipitated by metapneumovirus infection.    Hospital course (by problem):      Problem/Plan - 1:  ·  Problem: Acute respiratory failure with hypoxia.   ·  Plan: -In ED, pt was tachy to 100 and O2 Sat 88%, started on nasal cannula  -no previous medical history, CXR in ED demonstrated hyperinflated lungs, concern for undiagnosed COPD   -determine if reactive airway disease vs COPD exacerbation  - ambulatory O2 sat 88-89     Problem/Plan - 2:  ·  Problem: Human metapneumovirus (hMPV) as cause of disease classified elsewhere.   ·  Plan: -supportive care, encourage fluid intake.     Problem/Plan - 3:  ·  Problem: COPD exacerbation.   ·  Plan: - current smoker, 1/2 ppd for 30 years  - currently on nasal cannula, duonebs, prednisone 40mg for 5 days, and azithromycin 500mg for 3 days   - outpatient Pulm follow-up for COPD work-up and management  - outpatient PCP follow-up.     Problem/Plan - 4:  ·  Problem: Elevated LFTs.   ·  Plan: - slightly elevated ALT and AST on ED admission  - PCP follow-up for liver ultrasound, hepatitis panel, and repeat LFTs.     Problem/Plan - 5:  ·  Problem: Tobacco use disorder.   ·  Plan: smokes 1/2 ppd for 20 years. Still thinking about quitting, she is unsure.  - smoking cessation counseling.     Problem/Plan - 6:  ·  Problem: Prophylactic measure.   ·  Plan: F: PO  E: replete PRN  N: regular  DVT ppx: lovenox  GI PPx: None  FULL CODE    Patient was discharged to: Home    New medications:     Physical exam at the time of discharge:  CONSTITUTIONAL: AOx3 Well groomed, no apparent distress, thin appearing   EYES: PERRLA and symmetric, EOMI, No conjunctival or scleral injection, non-icteric   RESP: On nasal canula (4L/min), mo respiratory distress, no use of accessory muscles; clear to ascultation with slight expiratory wheezing noted on left upper lobe  CV: RRR, +S1S2  Gastrointestinal: abdomen soft, NT/ND; no rebound or guarding; +BSx4  Extremities: WWP, no clubbing or cyanosis; no peripheral edema  Musculoskeletal: NROM x4  Vascular: 2+ radial, DP/PT pulses B/L  Dermatologic: skin warm, dry and intact; no rashes, wounds, or scars  Lymphatic: no submandibular or cervical LAD  Neurologic: AAOx3; no focal deficits  Psychiatric: affect and characteristics of appearance, verbalizations, behaviors are appropriate     #Discharge: do not delete    63 year old F with no significant PMH and extensive current smoking history (1/2ppd for 30 years) admitted for acute hypoxic respiratory failure likely in setting of reactive airway disease or acute COPD exacerbation (previously undiagnosed) precipitated by metapneumovirus infection.    Hospital course (by problem):      Problem/Plan - 1:  ·  Problem: Acute respiratory failure with hypoxia.   ·  Plan: In ED, pt was tachy to 100 and O2 Sat 88%, started on nasal cannula but later tolerating RA w/o shortness of breath. Likely with underlying obstructive lung disease given hx of smoking, but she has not seen doctors in many years and has never seen a pulmonologist. Received 160mg solumedrol and duonebs in ED. CXR without infiltrates but with hyperinflated lungs. Noted to have diffuse wheezing by ED provider.  -no previous medical history, CXR in ED demonstrated hyperinflated lungs, concern for undiagnosed COPD   - ambulatory O2 sat 92-93     Problem/Plan - 2:  ·  Problem: Human metapneumovirus (hMPV) as cause of disease classified elsewhere.   ·  Plan: -supportive care, encourage fluid intake.     Problem/Plan - 3:  ·  Problem: COPD exacerbation.   ·  Plan: - current smoker, 1/2 ppd for 30 years  - currently on duonebs, prednisone 40mg for 5 days   - outpatient Pulm follow-up for COPD work-up and management  - outpatient PCP follow-up.     Problem/Plan - 4:  ·  Problem: Elevated LFTs.   ·  Plan: - slightly elevated ALT and AST on ED admission  - PCP follow-up for liver ultrasound, hepatitis panel, and repeat LFTs.     Problem/Plan - 5:  ·  Problem: Tobacco use disorder.   ·  Plan: smokes 1/2 ppd for 20 years. Still thinking about quitting, she is unsure.  - smoking cessation counseling.     Problem/Plan - 6:  ·  Problem: Prophylactic measure.   ·  Plan: F: PO  E: replete PRN  N: regular  DVT ppx: lovenox  GI PPx: None  FULL CODE    Patient was discharged to: Home    New medications: Prednisone 40mg through 2/4, albuterol pump    Physical exam at the time of discharge:  CONSTITUTIONAL: AOx3 Well groomed, no apparent distress, thin appearing   EYES: PERRLA and symmetric, EOMI, No conjunctival or scleral injection, non-icteric   RESP: On nasal canula (4L/min), mo respiratory distress, no use of accessory muscles; clear to ascultation with slight expiratory wheezing noted on left upper lobe  CV: RRR, +S1S2  Gastrointestinal: abdomen soft, NT/ND; no rebound or guarding; +BSx4  Extremities: WWP, no clubbing or cyanosis; no peripheral edema  Musculoskeletal: NROM x4  Vascular: 2+ radial, DP/PT pulses B/L  Dermatologic: skin warm, dry and intact; no rashes, wounds, or scars  Lymphatic: no submandibular or cervical LAD  Neurologic: AAOx3; no focal deficits  Psychiatric: affect and characteristics of appearance, verbalizations, behaviors are appropriate

## 2023-01-31 NOTE — H&P ADULT - ATTENDING COMMENTS
#AHRF: +hMPV, active smoker, +wheezing on exam. ?underlying COPD. CXR +hyperinflation, no infiltrate. Non tachy, wells low risk for PE. Euvolemic on exam. c/w dounebs, Prednisone, nicotene patch, pulm consult in am, wean off o2 as tolerated.      #r/o COPD: w/ exacerbation, managament as above. Pulm consult      #HmPV: c/w supportive care, isolation precautions.

## 2023-01-31 NOTE — H&P ADULT - PROBLEM SELECTOR PLAN 1
2/2 human metapneumovirus. Likely with underlying obstructive lung disease given hx of smoking but she has not seen doctors in many years and never seen a pulmonologist. Received 160mg solumedrol and duonebs in ED. Do not suspect COPD/asthma exacerbation. CXR without infiltrates.  - wean O2 as tolerated  - encourage IS, OOBTC  - would benefit from outpatient f/u with pulm for PFTs and new PCP  - c/w DuoNebs q6 2/2 human metapneumovirus related COPD exacerbation.  - wean O2 as tolerated  - encourage IS, OOBTC

## 2023-01-31 NOTE — DISCHARGE NOTE NURSING/CASE MANAGEMENT/SOCIAL WORK - NSDCPEEMAIL_GEN_ALL_CORE
[facility] email [email or web address]

## 2023-01-31 NOTE — PROGRESS NOTE ADULT - PROBLEM SELECTOR PLAN 1
-In ED, pt was tachy to 100 and O2 Sat 88%, started on nasal cannula  -no previous medical history, CXR in ED demonstrated hyperinflated lungs, concern for undiagnosed COPD   -determine if reactive airway disease vs COPD exacerbation  -wean of O2 and ambulatory O2 sat -In ED, pt was tachy to 100 and O2 Sat 88%, started on nasal cannula  -no previous medical history, CXR in ED demonstrated hyperinflated lungs, concern for undiagnosed COPD   -determine if reactive airway disease vs COPD exacerbation  - ambulatory O2 sat 88-89  - will consult pulm -In ED, pt was tachy to 100 and O2 Sat 88%, started on nasal cannula  -no previous medical history, CXR in ED demonstrated hyperinflated lungs, concern for undiagnosed COPD   -determine if reactive airway disease vs COPD exacerbation  - ambulatory O2 sat 88-89

## 2023-01-31 NOTE — DIETITIAN INITIAL EVALUATION ADULT - OTHER INFO
63F no known PMH, current smoker presents for SOB admitted for acute hypoxic respiratory failure 2/2 human metapneumovirus infection.    Pt seen at bedside for an initial assessment. Placed on a Regular diet; noted an allergy to calamari. Pt reported good appetite, typically consumes ~4-5 small meals per day. Pt was not following a special diet PTA; no cultural or Protestant food preferences. Suspect meeting >/= 75% of estimated nutrient needs PTA and currently based on diet recall. Denies N/V/D/C; no abd distention per EMR. Denies chewing or swallowing difficulty. Pt reported UBW consistently 105lb and denies any recent wt fluctuations. Pt denies pain/discomfort. Per EMR, skin WDL, no edema currently. Trever = 22. + NFPE with wasting/loss in temples (moderate), orbitals (moderate), buccal (mild), clavicles (moderate), scapula (moderate), and shoulders (moderate). Per ASPEN guidelines, pt meets criteria for chronic moderate malnutrition. Pt expressed interest in wt gain; educated on strategies for incorporating more calories/protein into each meal. Discussed added nourishments and nutritional supplementation during admission. Pt amenable to trying Ensure Max Protein; RD provided trial prior to pt’s discharge (1/31/23). Please see recommendations below. RD to follow.

## 2023-01-31 NOTE — H&P ADULT - NSHPPHYSICALEXAM_GEN_ALL_CORE
.  VITAL SIGNS:  T(C): 36.7 (01-31-23 @ 00:18), Max: 37.1 (01-30-23 @ 15:47)  T(F): 98 (01-31-23 @ 00:18), Max: 98.8 (01-30-23 @ 23:26)  HR: 85 (01-31-23 @ 00:18) (83 - 100)  BP: 128/79 (01-31-23 @ 00:18) (128/79 - 160/90)  BP(mean): --  RR: 18 (01-31-23 @ 00:18) (18 - 22)  SpO2: 93% (01-31-23 @ 00:18) (88% - 100%)  Wt(kg): --    PHYSICAL EXAM:    Constitutional: resting comfortably in bed; NAD  Head: NC/AT  Eyes: EOMI, anicteric sclera  ENT: no nasal discharge; uvula midline, no oropharyngeal erythema or exudates; MMM  Neck: supple; no JVD or thyromegaly  Respiratory: CTA B/L; no W/R/R, no retractions  Cardiac: +S1/S2; RRR; no M/R/G  Gastrointestinal: abdomen soft, NT/ND; no rebound or guarding; +BSx4  Extremities: WWP, no clubbing or cyanosis; no peripheral edema  Musculoskeletal: NROM x4  Vascular: 2+ radial, DP/PT pulses B/L  Dermatologic: skin warm, dry and intact; no rashes, wounds, or scars  Lymphatic: no submandibular or cervical LAD  Neurologic: AAOx3; no focal deficits  Psychiatric: affect and characteristics of appearance, verbalizations, behaviors are appropriate

## 2023-01-31 NOTE — PROGRESS NOTE ADULT - PROBLEM SELECTOR PLAN 5
smokes 1/2 ppd for 20 years. Still thinking about quitting, she is unsure.  - smoking cessation counseling.

## 2023-01-31 NOTE — DISCHARGE NOTE PROVIDER - NSDCCPCAREPLAN_GEN_ALL_CORE_FT
PRINCIPAL DISCHARGE DIAGNOSIS  Diagnosis: Infection due to human metapneumovirus (hMPV)  Assessment and Plan of Treatment:        PRINCIPAL DISCHARGE DIAGNOSIS  Diagnosis: Infection due to human metapneumovirus (hMPV)  Assessment and Plan of Treatment: You were diagnosed with human metapneumovirus (hMPV), a leading cause of acute respiratory infection. hMPV is most commonly spread from person to person through close contact with someone who is infected via secretions from coughing and sneezing or touching objects that have the virus on them. hMPV can cause upper and lower respiratory disease in patients of all ages. However, it is most common in young children and the elderly in whom it is more likely to develop into bronchiolitis, bronchitis or pneumonia. Though history of asthma, COPD, emphysema or any other lung disease does not make someone more likely to contract the illness, once they have it, these lung diseases can make symptoms more severe. Although for many, hMPV commonly clears up on its own, patients with more acute presentation may need inpatient treatment. Patients with more severe wheezing and coughing may require a temporary inhaler, which may include an inhaled corticosteroid or a stronger oral medication like prednisone; while inpatient, you received treatment with prednisone to ease your symptoms.       PRINCIPAL DISCHARGE DIAGNOSIS  Diagnosis: Infection due to human metapneumovirus (hMPV)  Assessment and Plan of Treatment: You were diagnosed with human metapneumovirus (hMPV), a leading cause of acute respiratory infection. hMPV is most commonly spread from person to person through close contact with someone who is infected via secretions from coughing and sneezing or touching objects that have the virus on them. hMPV can cause upper and lower respiratory disease in patients of all ages. However, it is most common in young children and the elderly in whom it is more likely to develop into bronchiolitis, bronchitis or pneumonia. Though history of asthma, COPD, emphysema or any other lung disease does not make someone more likely to contract the illness, once they have it, these lung diseases can make symptoms more severe. Although for many, hMPV commonly clears up on its own, patients with more acute presentation may need inpatient treatment. Patients with more severe wheezing and coughing may require a temporary inhaler, which may include an inhaled corticosteroid or a stronger oral medication like prednisone; while inpatient, you received treatment with prednisone to ease your symptoms.  We are sending you home with a prescription for Prednisone 40mg through February 4th as well as an albuterol inhalation pump.

## 2023-01-31 NOTE — PROGRESS NOTE ADULT - REASON FOR ADMISSION
acute hypoxic respiratory illness secondary to metapneumovirus (reactive airway disease vs COPD exacerbation) acute hypoxic respiratory illness secondary to metapneumovirus (in the setting of reactive airway disease vs COPD exacerbation)

## 2023-02-03 ENCOUNTER — NON-APPOINTMENT (OUTPATIENT)
Age: 63
End: 2023-02-03

## 2023-02-04 LAB
CULTURE RESULTS: SIGNIFICANT CHANGE UP
CULTURE RESULTS: SIGNIFICANT CHANGE UP
SPECIMEN SOURCE: SIGNIFICANT CHANGE UP
SPECIMEN SOURCE: SIGNIFICANT CHANGE UP

## 2023-02-07 ENCOUNTER — APPOINTMENT (OUTPATIENT)
Dept: INTERNAL MEDICINE | Facility: CLINIC | Age: 63
End: 2023-02-07

## 2023-02-09 DIAGNOSIS — J44.1 CHRONIC OBSTRUCTIVE PULMONARY DISEASE WITH (ACUTE) EXACERBATION: ICD-10-CM

## 2023-02-09 DIAGNOSIS — Z88.0 ALLERGY STATUS TO PENICILLIN: ICD-10-CM

## 2023-02-09 DIAGNOSIS — E44.0 MODERATE PROTEIN-CALORIE MALNUTRITION: ICD-10-CM

## 2023-02-09 DIAGNOSIS — B97.81 HUMAN METAPNEUMOVIRUS AS THE CAUSE OF DISEASES CLASSIFIED ELSEWHERE: ICD-10-CM

## 2023-02-09 DIAGNOSIS — R94.5 ABNORMAL RESULTS OF LIVER FUNCTION STUDIES: ICD-10-CM

## 2023-02-09 DIAGNOSIS — J96.01 ACUTE RESPIRATORY FAILURE WITH HYPOXIA: ICD-10-CM

## 2023-02-09 DIAGNOSIS — F17.210 NICOTINE DEPENDENCE, CIGARETTES, UNCOMPLICATED: ICD-10-CM

## 2023-02-14 ENCOUNTER — APPOINTMENT (OUTPATIENT)
Dept: INTERNAL MEDICINE | Facility: CLINIC | Age: 63
End: 2023-02-14

## 2023-05-30 ENCOUNTER — APPOINTMENT (OUTPATIENT)
Dept: INTERNAL MEDICINE | Facility: CLINIC | Age: 63
End: 2023-05-30

## 2023-07-13 ENCOUNTER — APPOINTMENT (OUTPATIENT)
Dept: INTERNAL MEDICINE | Facility: CLINIC | Age: 63
End: 2023-07-13

## 2023-09-21 ENCOUNTER — EMERGENCY (EMERGENCY)
Facility: HOSPITAL | Age: 63
LOS: 1 days | Discharge: HOME | End: 2023-09-21
Attending: EMERGENCY MEDICINE | Admitting: EMERGENCY MEDICINE
Payer: SELF-PAY

## 2023-09-21 VITALS
HEART RATE: 74 BPM | TEMPERATURE: 98 F | WEIGHT: 116.84 LBS | SYSTOLIC BLOOD PRESSURE: 126 MMHG | DIASTOLIC BLOOD PRESSURE: 83 MMHG | HEIGHT: 68 IN | RESPIRATION RATE: 17 BRPM | OXYGEN SATURATION: 98 %

## 2023-09-21 PROCEDURE — 99283 EMERGENCY DEPT VISIT LOW MDM: CPT

## 2023-09-21 NOTE — ED PROVIDER NOTE - PATIENT PORTAL LINK FT
You can access the FollowMyHealth Patient Portal offered by [facility] by registering at the following website: [email or web address]. By joining [facility]’s FollowMyHealth portal, you will also be able to view your health information using other applications (apps) compatible with our system.

## 2023-09-21 NOTE — ED PROVIDER NOTE - OBJECTIVE STATEMENT
Pt is a 63 yr old female who is c/o having nodules/cysts in her left chest and also left axilla.  She states that have been there a long time.  No pain.  No family hx of cancer.  She does not have health care insurance.  Pt is a daily smoker; states not interested in quitting at this time.    PMH: None  Meds: None  Social hx:  (+) tobacco use, occasional wine  Allergies: Penicillin  PCP: none

## 2023-09-21 NOTE — ED ADULT TRIAGE NOTE - CHIEF COMPLAINT QUOTE
patient here with small cyst above left breast and under left armpit; noticed over the weekend and wants to be evaluated; not having pain at this time

## 2023-09-21 NOTE — ED ADULT NURSE NOTE - OBJECTIVE STATEMENT
Pt presents to ed co feeling a fatty lump left axilla x this weekend. Patient reports having a fatty lump on left chest x 3 months. Patient denies pain. Patient denies Hx, reports allergy to penicillin, no medications reported. Patient reports being a half a pack smoker.

## 2023-09-21 NOTE — ED PROVIDER NOTE - CLINICAL SUMMARY MEDICAL DECISION MAKING FREE TEXT BOX
Patient with skin cysts of her epidermis for several months  No pain and no tenderness  Recommended to follow up with dermatology as an outpatient.  I advised pt that she will need an evaluation and/or biopsy by dermatologists.

## 2023-09-21 NOTE — ED PROVIDER NOTE - NSFOLLOWUPINSTRUCTIONS_ED_ALL_ED_FT
Thank you for letting us take care of you today.  Return to the Emergency Department if your symptoms worsen, or if any problems.    [facility] has great physicians and dermatologists.  Please call [phone] to make an appointment at the [facility] so you can have evaluation of your skin cysts.    I recommend that you do quit smoking cigarettes.  If interested, please call the [place] Quit Line at [phone] ([phone]).  You will be assigned a "Quit " and also be mailed free nicotine replacement therapy.

## 2023-09-25 DIAGNOSIS — L72.9 FOLLICULAR CYST OF THE SKIN AND SUBCUTANEOUS TISSUE, UNSPECIFIED: ICD-10-CM

## 2023-09-25 DIAGNOSIS — Z91.018 ALLERGY TO OTHER FOODS: ICD-10-CM

## 2023-09-25 DIAGNOSIS — F17.200 NICOTINE DEPENDENCE, UNSPECIFIED, UNCOMPLICATED: ICD-10-CM

## 2023-09-25 DIAGNOSIS — Z88.0 ALLERGY STATUS TO PENICILLIN: ICD-10-CM

## 2023-12-15 ENCOUNTER — EMERGENCY (EMERGENCY)
Facility: HOSPITAL | Age: 63
LOS: 1 days | Discharge: LEFT BEFORE DISCHARGE | End: 2023-12-15
Attending: EMERGENCY MEDICINE | Admitting: EMERGENCY MEDICINE
Payer: MEDICAID

## 2023-12-15 VITALS
OXYGEN SATURATION: 95 % | HEIGHT: 68 IN | HEART RATE: 96 BPM | SYSTOLIC BLOOD PRESSURE: 121 MMHG | WEIGHT: 130.07 LBS | DIASTOLIC BLOOD PRESSURE: 83 MMHG | TEMPERATURE: 97 F | RESPIRATION RATE: 20 BRPM

## 2023-12-15 VITALS — OXYGEN SATURATION: 88 % | RESPIRATION RATE: 20 BRPM | TEMPERATURE: 98 F | HEART RATE: 114 BPM

## 2023-12-15 DIAGNOSIS — Z88.0 ALLERGY STATUS TO PENICILLIN: ICD-10-CM

## 2023-12-15 DIAGNOSIS — Z53.29 PROCEDURE AND TREATMENT NOT CARRIED OUT BECAUSE OF PATIENT'S DECISION FOR OTHER REASONS: ICD-10-CM

## 2023-12-15 DIAGNOSIS — J43.9 EMPHYSEMA, UNSPECIFIED: ICD-10-CM

## 2023-12-15 DIAGNOSIS — R06.02 SHORTNESS OF BREATH: ICD-10-CM

## 2023-12-15 DIAGNOSIS — Z20.822 CONTACT WITH AND (SUSPECTED) EXPOSURE TO COVID-19: ICD-10-CM

## 2023-12-15 DIAGNOSIS — Z91.018 ALLERGY TO OTHER FOODS: ICD-10-CM

## 2023-12-15 LAB
ALBUMIN SERPL ELPH-MCNC: 3.7 G/DL — SIGNIFICANT CHANGE UP (ref 3.4–5)
ALBUMIN SERPL ELPH-MCNC: 3.7 G/DL — SIGNIFICANT CHANGE UP (ref 3.4–5)
ALP SERPL-CCNC: 98 U/L — SIGNIFICANT CHANGE UP (ref 40–120)
ALP SERPL-CCNC: 98 U/L — SIGNIFICANT CHANGE UP (ref 40–120)
ALT FLD-CCNC: 36 U/L — SIGNIFICANT CHANGE UP (ref 12–42)
ALT FLD-CCNC: 36 U/L — SIGNIFICANT CHANGE UP (ref 12–42)
ANION GAP SERPL CALC-SCNC: 9 MMOL/L — SIGNIFICANT CHANGE UP (ref 9–16)
ANION GAP SERPL CALC-SCNC: 9 MMOL/L — SIGNIFICANT CHANGE UP (ref 9–16)
AST SERPL-CCNC: 30 U/L — SIGNIFICANT CHANGE UP (ref 15–37)
AST SERPL-CCNC: 30 U/L — SIGNIFICANT CHANGE UP (ref 15–37)
BILIRUB SERPL-MCNC: 0.4 MG/DL — SIGNIFICANT CHANGE UP (ref 0.2–1.2)
BILIRUB SERPL-MCNC: 0.4 MG/DL — SIGNIFICANT CHANGE UP (ref 0.2–1.2)
BUN SERPL-MCNC: 12 MG/DL — SIGNIFICANT CHANGE UP (ref 7–23)
BUN SERPL-MCNC: 12 MG/DL — SIGNIFICANT CHANGE UP (ref 7–23)
CALCIUM SERPL-MCNC: 9.2 MG/DL — SIGNIFICANT CHANGE UP (ref 8.5–10.5)
CALCIUM SERPL-MCNC: 9.2 MG/DL — SIGNIFICANT CHANGE UP (ref 8.5–10.5)
CHLORIDE SERPL-SCNC: 100 MMOL/L — SIGNIFICANT CHANGE UP (ref 96–108)
CHLORIDE SERPL-SCNC: 100 MMOL/L — SIGNIFICANT CHANGE UP (ref 96–108)
CO2 SERPL-SCNC: 26 MMOL/L — SIGNIFICANT CHANGE UP (ref 22–31)
CO2 SERPL-SCNC: 26 MMOL/L — SIGNIFICANT CHANGE UP (ref 22–31)
CREAT SERPL-MCNC: 0.61 MG/DL — SIGNIFICANT CHANGE UP (ref 0.5–1.3)
CREAT SERPL-MCNC: 0.61 MG/DL — SIGNIFICANT CHANGE UP (ref 0.5–1.3)
EGFR: 101 ML/MIN/1.73M2 — SIGNIFICANT CHANGE UP
EGFR: 101 ML/MIN/1.73M2 — SIGNIFICANT CHANGE UP
FLUAV AG NPH QL: SIGNIFICANT CHANGE UP
FLUAV AG NPH QL: SIGNIFICANT CHANGE UP
FLUBV AG NPH QL: SIGNIFICANT CHANGE UP
FLUBV AG NPH QL: SIGNIFICANT CHANGE UP
GLUCOSE SERPL-MCNC: 111 MG/DL — HIGH (ref 70–99)
GLUCOSE SERPL-MCNC: 111 MG/DL — HIGH (ref 70–99)
HCT VFR BLD CALC: 43 % — SIGNIFICANT CHANGE UP (ref 34.5–45)
HCT VFR BLD CALC: 43 % — SIGNIFICANT CHANGE UP (ref 34.5–45)
HGB BLD-MCNC: 14.4 G/DL — SIGNIFICANT CHANGE UP (ref 11.5–15.5)
HGB BLD-MCNC: 14.4 G/DL — SIGNIFICANT CHANGE UP (ref 11.5–15.5)
LACTATE BLDV-MCNC: 1.2 MMOL/L — SIGNIFICANT CHANGE UP (ref 0.5–2)
LACTATE BLDV-MCNC: 1.2 MMOL/L — SIGNIFICANT CHANGE UP (ref 0.5–2)
MCHC RBC-ENTMCNC: 31.9 PG — SIGNIFICANT CHANGE UP (ref 27–34)
MCHC RBC-ENTMCNC: 31.9 PG — SIGNIFICANT CHANGE UP (ref 27–34)
MCHC RBC-ENTMCNC: 33.5 GM/DL — SIGNIFICANT CHANGE UP (ref 32–36)
MCHC RBC-ENTMCNC: 33.5 GM/DL — SIGNIFICANT CHANGE UP (ref 32–36)
MCV RBC AUTO: 95.3 FL — SIGNIFICANT CHANGE UP (ref 80–100)
MCV RBC AUTO: 95.3 FL — SIGNIFICANT CHANGE UP (ref 80–100)
NRBC # BLD: 0 /100 WBCS — SIGNIFICANT CHANGE UP (ref 0–0)
NRBC # BLD: 0 /100 WBCS — SIGNIFICANT CHANGE UP (ref 0–0)
NT-PROBNP SERPL-SCNC: 223 PG/ML — SIGNIFICANT CHANGE UP
NT-PROBNP SERPL-SCNC: 223 PG/ML — SIGNIFICANT CHANGE UP
PLATELET # BLD AUTO: 219 K/UL — SIGNIFICANT CHANGE UP (ref 150–400)
PLATELET # BLD AUTO: 219 K/UL — SIGNIFICANT CHANGE UP (ref 150–400)
POTASSIUM SERPL-MCNC: 4.5 MMOL/L — SIGNIFICANT CHANGE UP (ref 3.5–5.3)
POTASSIUM SERPL-MCNC: 4.5 MMOL/L — SIGNIFICANT CHANGE UP (ref 3.5–5.3)
POTASSIUM SERPL-SCNC: 4.5 MMOL/L — SIGNIFICANT CHANGE UP (ref 3.5–5.3)
POTASSIUM SERPL-SCNC: 4.5 MMOL/L — SIGNIFICANT CHANGE UP (ref 3.5–5.3)
PROT SERPL-MCNC: 7.3 G/DL — SIGNIFICANT CHANGE UP (ref 6.4–8.2)
PROT SERPL-MCNC: 7.3 G/DL — SIGNIFICANT CHANGE UP (ref 6.4–8.2)
RBC # BLD: 4.51 M/UL — SIGNIFICANT CHANGE UP (ref 3.8–5.2)
RBC # BLD: 4.51 M/UL — SIGNIFICANT CHANGE UP (ref 3.8–5.2)
RBC # FLD: 14.1 % — SIGNIFICANT CHANGE UP (ref 10.3–14.5)
RBC # FLD: 14.1 % — SIGNIFICANT CHANGE UP (ref 10.3–14.5)
RSV RNA NPH QL NAA+NON-PROBE: SIGNIFICANT CHANGE UP
RSV RNA NPH QL NAA+NON-PROBE: SIGNIFICANT CHANGE UP
SARS-COV-2 RNA SPEC QL NAA+PROBE: SIGNIFICANT CHANGE UP
SARS-COV-2 RNA SPEC QL NAA+PROBE: SIGNIFICANT CHANGE UP
SODIUM SERPL-SCNC: 135 MMOL/L — SIGNIFICANT CHANGE UP (ref 132–145)
SODIUM SERPL-SCNC: 135 MMOL/L — SIGNIFICANT CHANGE UP (ref 132–145)
TROPONIN I, HIGH SENSITIVITY RESULT: 4.6 NG/L — SIGNIFICANT CHANGE UP
TROPONIN I, HIGH SENSITIVITY RESULT: 4.6 NG/L — SIGNIFICANT CHANGE UP
WBC # BLD: 10.67 K/UL — HIGH (ref 3.8–10.5)
WBC # BLD: 10.67 K/UL — HIGH (ref 3.8–10.5)
WBC # FLD AUTO: 10.67 K/UL — HIGH (ref 3.8–10.5)
WBC # FLD AUTO: 10.67 K/UL — HIGH (ref 3.8–10.5)

## 2023-12-15 PROCEDURE — 71275 CT ANGIOGRAPHY CHEST: CPT | Mod: 26

## 2023-12-15 PROCEDURE — 71046 X-RAY EXAM CHEST 2 VIEWS: CPT | Mod: 26

## 2023-12-15 PROCEDURE — 99285 EMERGENCY DEPT VISIT HI MDM: CPT

## 2023-12-15 RX ORDER — IPRATROPIUM/ALBUTEROL SULFATE 18-103MCG
3 AEROSOL WITH ADAPTER (GRAM) INHALATION ONCE
Refills: 0 | Status: COMPLETED | OUTPATIENT
Start: 2023-12-15 | End: 2023-12-15

## 2023-12-15 RX ORDER — ALBUTEROL 90 UG/1
2 AEROSOL, METERED ORAL ONCE
Refills: 0 | Status: COMPLETED | OUTPATIENT
Start: 2023-12-15 | End: 2023-12-15

## 2023-12-15 RX ORDER — ALBUTEROL 90 UG/1
2 AEROSOL, METERED ORAL
Qty: 1 | Refills: 0
Start: 2023-12-15 | End: 2024-01-13

## 2023-12-15 RX ADMIN — Medication 3 MILLILITER(S): at 16:59

## 2023-12-15 RX ADMIN — ALBUTEROL 2 PUFF(S): 90 AEROSOL, METERED ORAL at 20:39

## 2023-12-15 RX ADMIN — Medication 40 MILLIGRAM(S): at 16:59

## 2023-12-15 NOTE — ED ADULT NURSE NOTE - OBJECTIVE STATEMENT
64 y/o F here with cough and SOB, afebrile, o2 sat 95 on RA, pt is a smoker states she feels like she has pna. NKA. A&OX4. Ambulatory. No N/V/D 62 y/o F here with cough and SOB, afebrile, o2 sat 95 on RA, pt is a smoker states she feels like she has pna. NKA. A&OX4. Ambulatory. No N/V/D

## 2023-12-15 NOTE — ED PROVIDER NOTE - ATTENDING APP SHARED VISIT CONTRIBUTION OF CARE
64 yo F with no PMH presents to the ED for sob and cough, worse with ambulation. No fevers or chills. No CP or SOB. Pt is a smoker. She has not seen a physician because she does not have insurance.     Const: NAD  Eyes: PERRL, no conjunctival injection  HENT:  Neck supple without meningismus   CV: RRR, Warm, well-perfused extremities  RESP: CTA B/L, no tachypnea   GI: soft, non-tender, non-distended  MSK: No gross deformities appreciated  Skin: Warm, dry. No rashes  Neuro: Alert, CNs II-XII grossly intact. Sensation and motor function of extremities grossly intact.  Psych: Appropriate mood and affect.    labs, cxr, ekg, ct

## 2023-12-15 NOTE — ED PROVIDER NOTE - NS ED ROS FT
+SOB, cough  Denies fevers, chills, nausea, vomiting, diarrhea, constipation, abdominal pain, urinary symptoms, chest pain, palpitations, dyspnea on exertion, syncope/near syncope, headache, weakness, numbness, focal deficits, visual changes, gait or balance changes, dizziness

## 2023-12-15 NOTE — ED PROVIDER NOTE - PROGRESS NOTE DETAILS
Patient reports improvement after receiving medications, however upon repeat testing of her vitals, pulse oxygen level noted to be 90% on room air.  An amatory oxygen level is obtained at this time and patient drifts down to 88 to 89%.  CT angio chest obtained to rule out any PE; no PE noted, however patient is found to have emphysema.  Will plan to admit patient for further workup as she does not have active insurance and has not been seen by pulmonologist.  Also patient may benefit from home oxygen and further evaluation. Patient states that this time she does not wish to remain in the emergency department.  She is requesting to be discharged and would like to leave AMA.  Patient demonstrated capacity to make decisions. Discussed risks of leaving against medical advice, which includes worsening of current medical condition, up to and including death. Patient understands risks and still wishes to leave. AMA paperwork signed and placed in chart.  Will plan to discharge with prednisone and given albuterol pump.  She is strongly encouraged to follow-up with pulm and primary care doctor.  Patient states she will first attempt to see a primary care as she believes this may be easier for her.  She is given very strict return precautions for any worsening symptoms which include worsening shortness of breath, chest pain, dizziness or lightheadedness.  Patient is agreeable.  She also will check her home oxygen levels with her pulse ox.  She is stable as she leaves, in no acute distress.

## 2023-12-15 NOTE — ED PROVIDER NOTE - PHYSICAL EXAMINATION
VITAL SIGNS: I have reviewed nursing notes and confirm.  CONSTITUTIONAL: Well-developed; well-nourished; in no acute distress.  SKIN: Skin is warm and dry, no acute rash.  HEAD: Normocephalic; atraumatic.  NECK: Supple; non tender.  CARD: S1, S2 normal; no murmurs, gallops, or rubs. Regular rate and rhythm.  RESP: +slight dec BS throughout w/o wheezes or rales. No rhonchi.  ABD: Soft; non-distended; non-tender; no rebound or guarding.  EXT: Normal ROM. No clubbing, cyanosis or edema.  NEURO: Alert, oriented. Grossly unremarkable. ABBOTT, normal tone, no gross motor or sensory changes. Fluent speech.   PSYCH: Cooperative, appropriate. Mood and affect wnl.

## 2023-12-15 NOTE — ED PROVIDER NOTE - NSFOLLOWUPINSTRUCTIONS_ED_ALL_ED_FT
COPD (Chronic Obstructive Pulmonary Disease)    WHAT YOU NEED TO KNOW:    What is COPD? COPD (chronic obstructive pulmonary disease) is a lung disease that causes breathing problems. COPD usually develops from years of irritation and inflammation in your lungs. Obstructive means airflow is blocked. This limits airflow out of your lungs. Smoking, breathing in pollution, genetics, or a history of lung infections can increase your risk for COPD.  Inspiration and Expiration    What are the signs and symptoms of COPD?    Shortness of breath    A dry cough    Coughing fits that bring up mucus from your lungs    Wheezing and chest tightness  How is COPD treated? Healthcare providers will work with you to create a care plan. The plan will contain goals defined by you and your providers. It will include steps to help you reach your goals within a specific time frame. The plan may change over time as your goals and needs change. The following may be included in your plan:    Medicines may be used to open your airway or decrease swelling and inflammation in your lungs. Antibiotics may be given for up to 5 days to treat a bacterial infection during an exacerbation. Medicines can relieve certain kinds of symptoms. A short-acting medicine relieves symptoms quickly. This may be called rescue medicine. A long-acting medicine controls or prevents symptoms. This may be called maintenance medicine. Your care plan will have directions for when to take each kind of medicine. Your healthcare provider will give you more information about the medicines you are given and how to use them safely.    Extra oxygen may help you breathe easier and feel more alert if you have severe COPD. Do not increase your oxygen levels without speaking to your doctor first.    Surgery is sometimes done if all other treatments have failed. A lung reduction is surgery to remove part of your damaged lung. A lung transplant is the replacement of your lung with a donor lung.  What can I do to help make breathing easier?    Use pursed-lip breathing any time you feel short of breath. Take a deep breath in through your nose. Slowly breathe out through your mouth with your lips pursed. Try to take 2 times as long to breathe out as to breathe in. This helps you get rid of as much air from your lungs as possible. You can also practice this breathing pattern while you bend, lift, climb stairs, or exercise. It slows down your breathing and helps move more air in and out of your lungs.  Breathe in Breathe out      Avoid anything that makes your symptoms worse. Stay out of high altitudes and places with high humidity. Stay inside, or cover your mouth and nose with a scarf when you are outside in cold weather. Stay inside on days when air pollution or pollen counts are high. Do not use aerosol sprays such as deodorant, bug spray, and hairspray.    Exercise as directed. Your healthcare provider may recommend at least 20 minutes of exercise each day to help increase your energy and decrease shortness of breath. Talk to your provider about the best exercise plan for you.   FAMILY WALKING FOR EXERCISE  How can I manage COPD and help prevent exacerbations? COPD is a serious condition that gets worse over time. A COPD exacerbation means your symptoms suddenly get worse. It is important to prevent exacerbations. An exacerbation can cause more lung damage. COPD cannot be cured, but you can take action to feel better and prevent exacerbations:    Do not smoke. Nicotine and other chemicals in cigarettes and cigars can cause lung damage and make your COPD worse. Ask your healthcare provider for information if you currently smoke and need help to quit. E-cigarettes or smokeless tobacco still contain nicotine. Talk to your healthcare provider before you use these products.    Avoid secondhand smoke. This is smoke another person exhales. Even if you have never smoked or have quit, it is important to avoid secondhand smoke. This smoke can also cause lung damage or trigger an exacerbation.    Go to pulmonary rehabilitation (rehab) if directed. Rehab is a program run by specialists who help you learn to manage COPD. Examples include a pulmonologist (lung specialist), dietitian, or exercise therapist. The specialists will help you make a plan to avoid triggers that cause an exacerbation.    Take your medicines as directed. Refill your medicines before you are out so that you do not miss a dose. Ask your healthcare provider if you have any questions on how to take your medicines.    Protect yourself from germs. Germs can get into your lungs and cause an infection. An infection in your lungs can create more mucus and make it harder to breathe. An infection can also create swelling in your airway and prevent air from getting in. You can decrease your risk for infection by doing the following:    Wash your hands often with soap and water. Carry germ-killing gel with you. You can use the gel to clean your hands when soap and water are not available.  Handwashing      Do not touch your eyes, nose, or mouth unless you have washed your hands first.    Always cover your mouth when you cough. Cough into a tissue or your shirtsleeve so you do not spread germs from your hands.    Try to avoid people who have a cold or the flu. If you are sick, stay away from others as much as possible.    Ask about vaccines you may need. Influenza (the flu), pneumonia, and COVID-19 can become life-threatening for a person who has COPD. Get a yearly flu vaccine as soon as recommended, usually in September or October. The pneumonia vaccine may be given every 5 years, or as directed. COVID-19 vaccines are available in shots given in 1 or 2 doses. Your healthcare provider can tell you if you should also get other vaccines, and when to get them.    Drink liquids as directed. You may need to drink more liquid than usual. Liquid will help to keep your air passages moist and help you cough up mucus. Ask how much liquid to drink each day and which liquids are best for you.  Call your local emergency number (911 in the ) if:    You feel lightheaded, short of breath, and have chest pain.    When should I seek immediate care?    You cough up blood.    You are confused, dizzy, or feel faint.    Your arm or leg feels warm, tender, and painful. It may look swollen and red.  When should I call my doctor?    You have increased shortness of breath.    You need more medicine than usual to control your symptoms.    You are coughing or wheezing more than usual.    You are coughing up more mucus, or it has a new color or odor.    You gain more than 3 pounds in a week.    You have a fever, a runny or stuffy nose, and a sore throat, or other cold or flu symptoms.    Your skin, lips, or nails start to turn blue.    You have swelling in your legs or ankles.    You are very tired or weak for more than a day.    You notice changes in your mood, or changes in your ability to think or concentrate.    You have questions or concerns about your condition or care.  CARE AGREEMENT:    You have the right to help plan your care. Learn about your health condition and how it may be treated. Discuss treatment options with your healthcare providers to decide what care you want to receive. You always have the right to refuse treatment.

## 2023-12-15 NOTE — ED PROVIDER NOTE - OBJECTIVE STATEMENT
63-year-old female, denies significant past medical history, current everyday smoker, presenting to the emergency department complaining of shortness of breath with ambulation as well as cough that has been intermittent for the past week.  Patient states she can walk about 1 block before she begins feeling short of breath.  The cough is occasionally productive with yellow sputum, otherwise it is dry.  She has stopped smoking since her symptoms started, but they have persisted.  Patient does not currently have a primary care doctor or has seen a pulmonologist. Patient also endorses checking her pulse oxygen level at home; she states the level will drop as low as 88% when she has her episodes of feeling short of breath. She also denies prior history of cardiac workup or stress test.  Patient denies chest pain, fevers, chills, nausea or vomiting.  She also denies recent travel or sick contacts.

## 2023-12-15 NOTE — ED PROVIDER NOTE - CLINICAL SUMMARY MEDICAL DECISION MAKING FREE TEXT BOX
63-year-old female, denies significant past medical history, current everyday smoker, presenting to the emergency department complaining of shortness of breath with ambulation as well as cough that has been intermittent for the past week. No distinctive wheezing noted on exam, however slight decreased breath sounds noted bilaterally.  Concern for possible emphysema versus pneumonia versus viral URI versus ACS versus PE.  Will plan to obtain EKG, chest x-ray, medical labs, and will give DuoNeb and prednisone for likely COPD exacerbation.  Will reassess and dispo pending medical workup and clinical improvement.

## 2023-12-15 NOTE — ED ADULT NURSE NOTE - NSFALLUNIVINTERV_ED_ALL_ED
Bed/Stretcher in lowest position, wheels locked, appropriate side rails in place/Call bell, personal items and telephone in reach/Instruct patient to call for assistance before getting out of bed/chair/stretcher/Non-slip footwear applied when patient is off stretcher/[place] to call system/Physically safe environment - no spills, clutter or unnecessary equipment/Purposeful proactive rounding/Room/bathroom lighting operational, light cord in reach Bed/Stretcher in lowest position, wheels locked, appropriate side rails in place/Call bell, personal items and telephone in reach/Instruct patient to call for assistance before getting out of bed/chair/stretcher/Non-slip footwear applied when patient is off stretcher/[place] to call system/Physically safe environment - no spills, clutter or unnecessary equipment/Purposeful proactive rounding/Room/bathroom lighting operational, light cord in reach

## 2023-12-15 NOTE — ED PROVIDER NOTE - PATIENT PORTAL LINK FT
You can access the FollowMyHealth Patient Portal offered by [facility] by registering at the following website: [email or web address]. By joining [employer]’s FollowMyHealth portal, you will also be able to view your health information using other applications (apps) compatible with our system. You can access the FollowMyHealth Patient Portal offered by [facility] by registering at the following website: [email or web address]. By joining [employer]’s FollowMyHealth portal, you will also be able to view your health information using other applications (apps) compatible with our system.

## 2024-09-10 NOTE — ED ADULT NURSE NOTE - NSHOSCREENINGQ1_ED_ALL_ED
Neurology Follow-up Visit    INTERVAL HISTORY   Karlene Baron is a 53 year old female with no systemic PMHx who presents for follow-up evaluation on 8/27/24 for headache.     8/27/24: Patient presents for follow-up evaluation and discussion of cervical MRI results. Patient with degenerative changes worse at C5-6 with ventral spinal cord indentation.   IMPRESSION:  1.  Straightening of the normal cervical lordosis may be related to patient  positioning, but could be seen with muscle strain/spasm.  2.  Degenerative disc and facet disease predominantly at C3-4, C4-5, and  C5-6 indenting the ventral spinal cord without cord signal abnormality.    REVIEW OF HISTORY:  There are no problems to display for this patient.      No past medical history on file.    No past surgical history on file.    No family history on file.    Social History     Tobacco Use    Smoking status: Never    Smokeless tobacco: Never   Substance Use Topics    Alcohol use: Never    Drug use: Never       OUTPATIENT MEDICATIONS:  No current outpatient medications on file.     No current facility-administered medications for this visit.        HOME MEDICATIONS:  (Not in a hospital admission)      ALLERGIES:  Allergies as of 08/27/2024    (No Known Allergies)        REVIEW OF SYSTEMS:   Constitutional: Negative.    HENT: Negative.    Eyes: Negative.    Respiratory: Negative.    Cardiovascular: Negative.    Gastrointestinal: Negative.    Endocrine: Negative.    Genitourinary: Negative.    Musculoskeletal: Negative.    Skin: Negative.    Allergic/Immunologic: Negative.    Neurological:        See HPI   Hematological: Negative.    Psychiatric/Behavioral:        See HPI     OBJECTIVE  PHYSICAL EXAM:   BP (P) 97/63 (BP Location: LUE - Left upper extremity, Patient Position: Sitting, Cuff Size: Regular)   Pulse 71   Ht 5' 5\" (1.651 m)   Wt 66.3 kg (146 lb 2.6 oz)   BMI 24.32 kg/m²   BSA 1.73 m²   General Appearance: In no apparent distress,  comfortable  Integument: Skin is warm and dry without cyanosis or pallor.  Head: Head is normocephalic, symmetric, atraumatic, and non-tender.  Neck: Neck is supple and without spinous or muscular tenderness. Full ROM.  Cardiovascular: Regular heart rate and rhythm. No carotid bruits or peripheral edema.  Pulmonary: No labored respirations or signs of respiratory distress.  Abdominal: Abdomen is soft and non-tender.  Musculoskeletal: No joint swelling or bony deformities.  Neurological:  Mental Status  Awake. Oriented to person, place, time and situation. Recent and remote memory are intact. Fund of knowledge is appropriate for level of education. Apraxia absent.     Cranial Nerves  CN I: Not tested  CN II: Visual acuity is normal. Visual fields full to confrontation.  CN III, IV, VI: Extraocular movements intact bilaterally. Normal lids and orbits bilaterally. Pupils equal round and reactive to light bilaterally.  CN V: Facial sensation is normal.  CN VII: Full and symmetric facial movement.  CN VIII: Hearing is normal.  CN IX, X: Palate elevates symmetrically. Normal gag reflex.  CN XI: Shoulder shrug strength is normal.  CN XII: Tongue midline without atrophy or fasciculations.     Motor  Normal muscle bulk throughout. No fasciculations present. Adequate muscle tone. No abnormal involuntary movements. Strength is 5/5 in all four extremities.     Sensory  Sensation to all modalities intact symmetrically.      Reflexes                                           Right                      Left  Brachioradialis                    2+                         2+  Biceps                                 2+                         2+  Triceps                                2+                         2+  Patellar                                2+                         2+  Achilles                                2+                         2+     Coordination  No dysmetria, no nystagmus     Gait  Gait is smooth and steady  with natural arm swing.     DATA:   LABORATORY:  I have reviewed the pertinent laboratory tests:      No results found for: \"CBCDIF\", \"SODIUM\", \"POTASSIUM\", \"CHLORIDE\", \"CO2\", \"BUN\", \"CREATININE\", \"ALBUMIN\", \"TP\", \"BILIRUBIN\", \"ALKPT\", \"GPT\", \"AST\", \"GLUCOSE\", \"PTT\", \"PT\", \"INR\", \"CHOLESTEROL\", \"TRIGLYCERIDE\", \"HDL\", \"CALCLDL\", \"HGBA1C\"       IMAGING/OTHER TESTING:  I have reviewed the pertinent imaging/study reports:      Narrative & Impression   EXAM: MRI CERVICAL SPINE WO CONTRAST     INDICATION: Cervical spondylosis.  Pain.  Paresthesias.     COMPARISON: None     TECHNIQUE: Multiplanar, multisequence MRI of the cervical spine was  obtained without the administration of intravenous contrast.  Specific  sequences include a three plane localizer, sagittal T1, T2, and STIR, and  axial T2 (repeated) and 2-D and 3-D gradient echo sequences.     FINDINGS:     The normal cervical lordosis is straightened, with minimal anterolisthesis  of C3 on C4 and of C4 on C5.  Mild posterior positioning of the left  lateral mass of C1 on C2 with neutral alignment on the right is likely due  to head rotation.     Vertebral body heights are preserved.  No acute fracture is seen.  No  suspicious marrow signal abnormality is seen.  Small amount of low signal  intensity at the C7 vertebral body is likely sclerosis possibly from  enostosis.     There is multilevel disc degeneration with mild narrowing at C3-4, C4-5,  and moderate narrowing at C5-6.  Small amount of degenerative endplate  changes are also seen.  Small ventral osteophytes are noted.     Cervical spinal cord signal appears normal.  The visible inferior portion  of the brainstem appears normal.  The cerebellar tonsils are borderline  low-lying without Chiari malformation.  There is no prevertebral edema.   Paraspinal musculature appears normal.     Specific spinal levels are as follows:     C2-3: No canal stenosis or foraminal narrowing.     C3-4: Small disc osteophyte  complex with small central disc protrusion  component and minimal facet hypertrophy contributes to mild to moderate  central canal stenosis without foraminal narrowing.  The ventral spinal  cord is indented without cord signal abnormality.     C4-5: Small disc osteophyte complex with central protrusion component and  minimal facet hypertrophy contributes to mild central canal stenosis and  minimal left foraminal narrowing.  The ventral spinal cord is indented  without cord signal abnormality.     C5-6: Disc osteophyte complex with central protrusion component and minimal  facet hypertrophy contributes to mild central canal stenosis and mild left  foraminal narrowing.  The ventral spinal cord is indented without cord  signal abnormality.     C6-7: Minimal disc bulge and minimal facet hypertrophy without canal  stenosis or foraminal narrowing.     C7-T1: Mild bilateral facet hypertrophy contributes to minimal bilateral  foraminal narrowing and minimal central canal stenosis.     IMPRESSION:  1.  Straightening of the normal cervical lordosis may be related to patient  positioning, but could be seen with muscle strain/spasm.  2.  Degenerative disc and facet disease predominantly at C3-4, C4-5, and  C5-6 indenting the ventral spinal cord without cord signal abnormality.  3.  Please see above for additional observations.     Electronically Signed by: BARTOLO CAMPOS M.D.   Signed on: 7/9/2024 12:23 PM   Workstation ID: [id number]              ASSESSMENT AND PLAN:   Karlene Baron is a 53 year old female with no systemic PMHx who presents for follow-up evaluation on 8/27/24 for headache.     8/27/24: Patient presents for follow-up evaluation and discussion of cervical MRI results in detail. Patient with degenerative changes worse at C5-6 with ventral spinal cord indentation.   IMPRESSION:  1.  Straightening of the normal cervical lordosis may be related to patient  positioning, but could be seen with muscle  strain/spasm.  2.  Degenerative disc and facet disease predominantly at C3-4, C4-5, and  C5-6 indenting the ventral spinal cord without cord signal abnormality.    Patient benefits from evaluation by pain management for further recommendations in management. Ample time provided to answer questions    1. Cervical spondylosis with radiculopathy  - SERVICE TO PAIN MANAGEMENT    2. Occipital neuralgia of left side  - SERVICE TO PAIN MANAGEMENT      Misty Veronica MD   (available via [employer])  [facility] - Neurology Attending  Date: 9/10/2024    Total time spent TODAY on this encounter, including pre-visit review of separately obtained history, face-to-face interaction performing medically appropriate physical exam, patient counseling/education, interpretation of diagnostic results, care coordination and documentation was 40  minutes.    Portions of this document were generated using voice recognition software. This software occasionally misinterprets dictation which can result in unintended wording, phrasing, grammar, or spelling.   No

## 2024-10-31 NOTE — ED ADULT TRIAGE NOTE - ESI TRIAGE ACUITY LEVEL, MLM
Patient is on with Hospice with Dr Geller still following as PCP but is unable to come into the office.    3

## 2024-12-08 ENCOUNTER — EMERGENCY (EMERGENCY)
Facility: HOSPITAL | Age: 64
LOS: 1 days | Discharge: LEFT BEFORE DISCHARGE | End: 2024-12-08
Attending: EMERGENCY MEDICINE | Admitting: EMERGENCY MEDICINE
Payer: MEDICAID

## 2024-12-08 VITALS
SYSTOLIC BLOOD PRESSURE: 157 MMHG | WEIGHT: 98.99 LBS | OXYGEN SATURATION: 90 % | RESPIRATION RATE: 20 BRPM | DIASTOLIC BLOOD PRESSURE: 86 MMHG | HEIGHT: 68 IN | TEMPERATURE: 99 F | HEART RATE: 99 BPM

## 2024-12-08 LAB
ALBUMIN SERPL ELPH-MCNC: 3.3 G/DL — LOW (ref 3.4–5)
ALP SERPL-CCNC: 172 U/L — HIGH (ref 40–120)
ALT FLD-CCNC: 29 U/L — SIGNIFICANT CHANGE UP (ref 12–42)
ANION GAP SERPL CALC-SCNC: 4 MMOL/L — LOW (ref 9–16)
AST SERPL-CCNC: 24 U/L — SIGNIFICANT CHANGE UP (ref 15–37)
BASOPHILS # BLD AUTO: 0 K/UL — SIGNIFICANT CHANGE UP (ref 0–0.2)
BASOPHILS NFR BLD AUTO: 0 % — SIGNIFICANT CHANGE UP (ref 0–2)
BILIRUB SERPL-MCNC: 0.3 MG/DL — SIGNIFICANT CHANGE UP (ref 0.2–1.2)
BUN SERPL-MCNC: 12 MG/DL — SIGNIFICANT CHANGE UP (ref 7–23)
CALCIUM SERPL-MCNC: 9 MG/DL — SIGNIFICANT CHANGE UP (ref 8.5–10.5)
CHLORIDE SERPL-SCNC: 94 MMOL/L — LOW (ref 96–108)
CO2 SERPL-SCNC: 33 MMOL/L — HIGH (ref 22–31)
CREAT SERPL-MCNC: 0.72 MG/DL — SIGNIFICANT CHANGE UP (ref 0.5–1.3)
D DIMER BLD IA.RAPID-MCNC: 1406 NG/ML DDU — HIGH
EGFR: 94 ML/MIN/1.73M2 — SIGNIFICANT CHANGE UP
EOSINOPHIL # BLD AUTO: 0.17 K/UL — SIGNIFICANT CHANGE UP (ref 0–0.5)
EOSINOPHIL NFR BLD AUTO: 1 % — SIGNIFICANT CHANGE UP (ref 0–6)
FLUAV AG NPH QL: SIGNIFICANT CHANGE UP
FLUBV AG NPH QL: SIGNIFICANT CHANGE UP
GLUCOSE SERPL-MCNC: 126 MG/DL — HIGH (ref 70–99)
HCT VFR BLD CALC: 38.5 % — SIGNIFICANT CHANGE UP (ref 34.5–45)
HGB BLD-MCNC: 12.9 G/DL — SIGNIFICANT CHANGE UP (ref 11.5–15.5)
LYMPHOCYTES # BLD AUTO: 1.38 K/UL — SIGNIFICANT CHANGE UP (ref 1–3.3)
LYMPHOCYTES # BLD AUTO: 8 % — LOW (ref 13–44)
MCHC RBC-ENTMCNC: 32.2 PG — SIGNIFICANT CHANGE UP (ref 27–34)
MCHC RBC-ENTMCNC: 33.5 G/DL — SIGNIFICANT CHANGE UP (ref 32–36)
MCV RBC AUTO: 96 FL — SIGNIFICANT CHANGE UP (ref 80–100)
MONOCYTES # BLD AUTO: 1.21 K/UL — HIGH (ref 0–0.9)
MONOCYTES NFR BLD AUTO: 7 % — SIGNIFICANT CHANGE UP (ref 2–14)
NEUTROPHILS # BLD AUTO: 14.52 K/UL — HIGH (ref 1.8–7.4)
NEUTROPHILS NFR BLD AUTO: 82 % — HIGH (ref 43–77)
NRBC # BLD: SIGNIFICANT CHANGE UP /100 WBCS (ref 0–0)
PLATELET # BLD AUTO: 330 K/UL — SIGNIFICANT CHANGE UP (ref 150–400)
POTASSIUM SERPL-MCNC: 4.4 MMOL/L — SIGNIFICANT CHANGE UP (ref 3.5–5.3)
POTASSIUM SERPL-SCNC: 4.4 MMOL/L — SIGNIFICANT CHANGE UP (ref 3.5–5.3)
PROT SERPL-MCNC: 7.4 G/DL — SIGNIFICANT CHANGE UP (ref 6.4–8.2)
RBC # BLD: 4.01 M/UL — SIGNIFICANT CHANGE UP (ref 3.8–5.2)
RBC # FLD: 13.6 % — SIGNIFICANT CHANGE UP (ref 10.3–14.5)
RSV RNA NPH QL NAA+NON-PROBE: SIGNIFICANT CHANGE UP
SARS-COV-2 RNA SPEC QL NAA+PROBE: SIGNIFICANT CHANGE UP
SODIUM SERPL-SCNC: 131 MMOL/L — LOW (ref 132–145)
WBC # BLD: 17.28 K/UL — HIGH (ref 3.8–10.5)
WBC # FLD AUTO: 17.28 K/UL — HIGH (ref 3.8–10.5)

## 2024-12-08 PROCEDURE — 99285 EMERGENCY DEPT VISIT HI MDM: CPT

## 2024-12-08 PROCEDURE — 71045 X-RAY EXAM CHEST 1 VIEW: CPT | Mod: 26

## 2024-12-08 RX ORDER — METHYLPREDNISOLONE SOD SUCC 125 MG
125 VIAL (EA) INJECTION ONCE
Refills: 0 | Status: COMPLETED | OUTPATIENT
Start: 2024-12-08 | End: 2024-12-08

## 2024-12-08 RX ORDER — IPRATROPIUM BROMIDE AND ALBUTEROL SULFATE 2.5; .5 MG/3ML; MG/3ML
3 SOLUTION RESPIRATORY (INHALATION)
Refills: 0 | Status: COMPLETED | OUTPATIENT
Start: 2024-12-08 | End: 2024-12-08

## 2024-12-08 RX ADMIN — Medication 125 MILLIGRAM(S): at 22:42

## 2024-12-08 RX ADMIN — IPRATROPIUM BROMIDE AND ALBUTEROL SULFATE 3 MILLILITER(S): 2.5; .5 SOLUTION RESPIRATORY (INHALATION) at 22:44

## 2024-12-08 NOTE — ED ADULT NURSE NOTE - OBJECTIVE STATEMENT
Pt here w SOB and productive cough. Endorses 1/2 PPD smoking history. Denies asthma or COPD. Denies fevers, NV chills.

## 2024-12-09 VITALS
RESPIRATION RATE: 17 BRPM | DIASTOLIC BLOOD PRESSURE: 80 MMHG | HEART RATE: 88 BPM | SYSTOLIC BLOOD PRESSURE: 113 MMHG | OXYGEN SATURATION: 94 %

## 2024-12-09 LAB
NT-PROBNP SERPL-SCNC: 226 PG/ML — SIGNIFICANT CHANGE UP
TROPONIN I, HIGH SENSITIVITY RESULT: 5.5 NG/L — SIGNIFICANT CHANGE UP

## 2024-12-09 PROCEDURE — 71275 CT ANGIOGRAPHY CHEST: CPT | Mod: 26,MC

## 2024-12-09 RX ORDER — AZITHROMYCIN 250 MG/1
500 TABLET, FILM COATED ORAL ONCE
Refills: 0 | Status: COMPLETED | OUTPATIENT
Start: 2024-12-09 | End: 2024-12-09

## 2024-12-09 RX ORDER — AZITHROMYCIN 250 MG/1
1 TABLET, FILM COATED ORAL
Qty: 4 | Refills: 0
Start: 2024-12-09 | End: 2024-12-12

## 2024-12-09 RX ORDER — PREDNISONE 20 MG/1
2 TABLET ORAL
Qty: 8 | Refills: 0
Start: 2024-12-09 | End: 2024-12-12

## 2024-12-09 RX ORDER — ALBUTEROL 90 MCG
2 AEROSOL (GRAM) INHALATION
Qty: 1 | Refills: 0
Start: 2024-12-09 | End: 2024-12-12

## 2024-12-09 RX ORDER — ALBUTEROL 90 MCG
1 AEROSOL (GRAM) INHALATION ONCE
Refills: 0 | Status: COMPLETED | OUTPATIENT
Start: 2024-12-09 | End: 2024-12-09

## 2024-12-09 RX ADMIN — AZITHROMYCIN 500 MILLIGRAM(S): 250 TABLET, FILM COATED ORAL at 04:29

## 2024-12-09 RX ADMIN — Medication 1 PUFF(S): at 04:29

## 2024-12-09 NOTE — ED PROVIDER NOTE - PATIENT PORTAL LINK FT
You can access the FollowMyHealth Patient Portal offered by [facility] by registering at the following website: [email or web address]. By joining [employer]s FollowMyHealth portal, you will also be able to view your health information using other applications (apps) compatible with our system.

## 2024-12-09 NOTE — ED PROVIDER NOTE - ATTENDING APP SHARED VISIT CONTRIBUTION OF CARE
65 yo F who is an active smoker presents to the ED for SOB. Associated with cough. No fevers or chills. In the ED patient had labs which demonstrated elevated ddimer but no PE on CT. Pt hypoxic in the ED and offered patient admission but she does not want to stay. Pt is alert and oriented x3 and understands risk of leaving AMA which is hypoxia which can lead to death. Pt signed out AMA

## 2024-12-09 NOTE — ED PROVIDER NOTE - CLINICAL SUMMARY MEDICAL DECISION MAKING FREE TEXT BOX
Patient presents today with complaint of shortness of breath.  She is noted to be hypoxic on arrival.  She reports similar symptoms to when she had a viral infection last year.  She is a daily smoker.  Chest x-ray is clear.  Labs are significant for elevated WBC and elevated D-dimer.  CTA of the chest ordered.  Patient continues to desat while exerting.  Recommended admission, but patient is resistant to this idea. Patient presents today with complaint of shortness of breath.  She is noted to be hypoxic on arrival.  She reports similar symptoms to when she had a viral infection last year.  She is a daily smoker.  Chest x-ray is clear.  Labs are significant for elevated WBC and elevated D-dimer.  CTA of the chest ordered.  Patient continues to desat while exerting.  Recommended admission, but patient is resistant to this idea.    The patient wishes to leave against medical advice.  I have discussed the risks, benefits and alternatives (including the possibility of worsening of disease, pain, permanent disability, and/or death) with the patient and his/her family (if available).  The patient voices understanding of these risks, benefits, and alternatives and still wishes to sign out against medical advice.  The patient is awake, alert, oriented  x 3 and has demonstrated capacity to refuse/direct care.  I have advised the patient that they can and should return immediately should they develop any worse/different/additional symptoms, or if they change their mind and want to continue their care.

## 2024-12-09 NOTE — ED PROVIDER NOTE - OBJECTIVE STATEMENT
64-year-old female presents today with shortness of breath.  Patient states her symptoms feel similar to last year when she had human metapneumovirus and she was very short of breath.  She describes a cough.  She denies fever, chills, chest pain, numbness, weakness, extremity swelling.  She is a daily smoker.  She does not wear oxygen usually.

## 2024-12-11 DIAGNOSIS — Z88.0 ALLERGY STATUS TO PENICILLIN: ICD-10-CM

## 2024-12-11 DIAGNOSIS — R06.02 SHORTNESS OF BREATH: ICD-10-CM

## 2024-12-11 DIAGNOSIS — R09.02 HYPOXEMIA: ICD-10-CM

## 2024-12-11 DIAGNOSIS — Z53.29 PROCEDURE AND TREATMENT NOT CARRIED OUT BECAUSE OF PATIENT'S DECISION FOR OTHER REASONS: ICD-10-CM

## 2024-12-11 DIAGNOSIS — F17.200 NICOTINE DEPENDENCE, UNSPECIFIED, UNCOMPLICATED: ICD-10-CM

## 2024-12-11 DIAGNOSIS — R05.9 COUGH, UNSPECIFIED: ICD-10-CM

## 2025-01-20 NOTE — ED PROVIDER NOTE - NSFOLLOWUPINSTRUCTIONS_ED_ALL_ED_FT
16
YOUR OXYGEN IS LOW. YOU SHOULD BE ADMITTED TO THE HOSPITAL. PLEASE TAKE THE STEROIDS AND ANTIBIOTICS AS PRESCRIBED. USE INHALER EVERY 4-6 HOURS FOR 4 DAYS.     FOLLOW UP WITH YOUR PRIMARY CARE DOCTOR.     DO NOT SMOKE.     RETURN TO THE ER AS SOON AS POSSIBLE. IF YOUR OXYGEN IS LOWER THAN 94% YOU SHOULD BE ADMITTED TO THE HOSPITAL FOR OXYGEN.     OXYGEN DEPRIVATION CAN LEAD TO DEATH AND BRAIN DAMAGE.

## 2025-03-22 NOTE — H&P ADULT - PROBLEM SELECTOR PROBLEM 1
[FreeTextEntry1] : 87-year-old female presenting office with a septic history of a vaginal infection.  She is followed by infectious disease and urology for chronic UTI and has been on antibiotics.  She reports receiving Diflucan as well and metronidazole.
Acute respiratory failure with hypoxia

## 2025-07-14 NOTE — DIETITIAN NUTRITION RISK NOTIFICATION - MUSCLE MASS (LOSS OF MUSCLE)
DISPLAY PLAN FREE TEXT
DISPLAY PLAN FREE TEXT
Temples.../Clavicles.../Shoulders.../Scapula...
DISPLAY PLAN FREE TEXT